# Patient Record
Sex: FEMALE | Race: WHITE | NOT HISPANIC OR LATINO | Employment: FULL TIME | URBAN - METROPOLITAN AREA
[De-identification: names, ages, dates, MRNs, and addresses within clinical notes are randomized per-mention and may not be internally consistent; named-entity substitution may affect disease eponyms.]

---

## 2018-01-26 ENCOUNTER — HISTORICAL (OUTPATIENT)
Dept: ADMINISTRATIVE | Facility: HOSPITAL | Age: 28
End: 2018-01-26

## 2018-04-24 ENCOUNTER — HISTORICAL (OUTPATIENT)
Dept: ADMINISTRATIVE | Facility: HOSPITAL | Age: 28
End: 2018-04-24

## 2018-11-08 ENCOUNTER — HISTORICAL (OUTPATIENT)
Dept: ADMINISTRATIVE | Facility: HOSPITAL | Age: 28
End: 2018-11-08

## 2018-12-16 ENCOUNTER — HISTORICAL (OUTPATIENT)
Dept: ADMINISTRATIVE | Facility: HOSPITAL | Age: 28
End: 2018-12-16

## 2019-01-03 ENCOUNTER — HISTORICAL (OUTPATIENT)
Dept: ADMINISTRATIVE | Facility: HOSPITAL | Age: 29
End: 2019-01-03

## 2019-04-22 ENCOUNTER — HISTORICAL (OUTPATIENT)
Dept: ADMINISTRATIVE | Facility: HOSPITAL | Age: 29
End: 2019-04-22

## 2019-04-22 LAB — B-HCG SERPL QL: NEGATIVE

## 2019-08-19 ENCOUNTER — HISTORICAL (OUTPATIENT)
Dept: ADMINISTRATIVE | Facility: HOSPITAL | Age: 29
End: 2019-08-19

## 2019-10-18 ENCOUNTER — HISTORICAL (OUTPATIENT)
Dept: ADMINISTRATIVE | Facility: HOSPITAL | Age: 29
End: 2019-10-18

## 2019-10-18 LAB
ABS NEUT (OLG): 3.88 X10(3)/MCL (ref 2.1–9.2)
BASOPHILS # BLD AUTO: 0 X10(3)/MCL (ref 0–0.2)
BASOPHILS NFR BLD AUTO: 1 %
EOSINOPHIL # BLD AUTO: 0.2 X10(3)/MCL (ref 0–0.9)
EOSINOPHIL NFR BLD AUTO: 3 %
ERYTHROCYTE [DISTWIDTH] IN BLOOD BY AUTOMATED COUNT: 22.7 % (ref 11.5–17)
FERRITIN SERPL-MCNC: 34 NG/ML (ref 8–388)
HCT VFR BLD AUTO: 41 % (ref 37–47)
HGB BLD-MCNC: 11.5 GM/DL (ref 12–16)
IMM GRANULOCYTES # BLD AUTO: 0.01 % (ref 0–0.02)
IMM GRANULOCYTES NFR BLD AUTO: 0.2 % (ref 0–0.43)
IRON SATN MFR SERPL: 7.6 % (ref 20–50)
IRON SERPL-MCNC: 24 MCG/DL (ref 50–175)
LYMPHOCYTES # BLD AUTO: 1.3 X10(3)/MCL (ref 0.6–4.6)
LYMPHOCYTES NFR BLD AUTO: 21 %
MCH RBC QN AUTO: 22 PG (ref 27–31)
MCHC RBC AUTO-ENTMCNC: 28 GM/DL (ref 33–36)
MCV RBC AUTO: 78.4 FL (ref 80–94)
MONOCYTES # BLD AUTO: 0.5 X10(3)/MCL (ref 0.1–1.3)
MONOCYTES NFR BLD AUTO: 8 %
NEUTROPHILS # BLD AUTO: 3.88 X10(3)/MCL (ref 1.4–7.9)
NEUTROPHILS NFR BLD AUTO: 66 %
PLATELET # BLD AUTO: 394 X10(3)/MCL (ref 130–400)
PMV BLD AUTO: 8.7 FL (ref 9.4–12.4)
RBC # BLD AUTO: 5.23 X10(6)/MCL (ref 4.2–5.4)
TIBC SERPL-MCNC: 315 MCG/DL (ref 250–450)
TRANSFERRIN SERPL-MCNC: 246 MG/DL (ref 200–360)
WBC # SPEC AUTO: 5.9 X10(3)/MCL (ref 4.5–11.5)

## 2020-07-01 ENCOUNTER — HISTORICAL (OUTPATIENT)
Dept: ADMINISTRATIVE | Facility: HOSPITAL | Age: 30
End: 2020-07-01

## 2022-01-26 ENCOUNTER — HISTORICAL (OUTPATIENT)
Dept: LAB | Facility: HOSPITAL | Age: 32
End: 2022-01-26

## 2022-01-26 LAB
ALBUMIN SERPL-MCNC: 4.4 GM/DL (ref 3.5–5)
ALBUMIN/GLOB SERPL: 1.2 RATIO (ref 1.1–2)
ALP SERPL-CCNC: 117 UNIT/L (ref 40–150)
ALT SERPL-CCNC: 8 UNIT/L (ref 0–55)
AST SERPL-CCNC: 11 UNIT/L (ref 5–34)
BILIRUB SERPL-MCNC: 0.4 MG/DL
BILIRUBIN DIRECT+TOT PNL SERPL-MCNC: 0.2 MG/DL (ref 0–0.5)
BILIRUBIN DIRECT+TOT PNL SERPL-MCNC: 0.2 MG/DL (ref 0–0.8)
BUN SERPL-MCNC: 9 MG/DL (ref 7–18.7)
CALCIUM SERPL-MCNC: 10 MG/DL (ref 8.7–10.5)
CHLORIDE SERPL-SCNC: 106 MMOL/L (ref 98–107)
CHOLEST SERPL-MCNC: 160 MG/DL
CHOLEST/HDLC SERPL: 4 {RATIO} (ref 0–5)
CO2 SERPL-SCNC: 25 MMOL/L (ref 22–29)
CREAT SERPL-MCNC: 0.86 MG/DL (ref 0.55–1.02)
DEPRECATED CALCIDIOL+CALCIFEROL SERPL-MC: 24.8 NG/ML (ref 30–80)
ERYTHROCYTE [DISTWIDTH] IN BLOOD BY AUTOMATED COUNT: 14 % (ref 11.5–17)
FT4I SERPL CALC-MCNC: 2.56 (ref 2.6–3.6)
GLOBULIN SER-MCNC: 3.6 GM/DL (ref 2.4–3.5)
GLUCOSE SERPL-MCNC: 93 MG/DL (ref 74–100)
HCT VFR BLD AUTO: 41.3 % (ref 37–47)
HDLC SERPL-MCNC: 45 MG/DL (ref 35–60)
HGB BLD-MCNC: 12.8 GM/DL (ref 12–16)
LDLC SERPL CALC-MCNC: 101 MG/DL (ref 50–140)
MCH RBC QN AUTO: 25.9 PG (ref 27–31)
MCHC RBC AUTO-ENTMCNC: 31 GM/DL (ref 33–36)
MCV RBC AUTO: 83.6 FL (ref 80–94)
PLATELET # BLD AUTO: 361 X10(3)/MCL (ref 130–400)
PMV BLD AUTO: 8.9 FL (ref 9.4–12.4)
POTASSIUM SERPL-SCNC: 4.3 MMOL/L (ref 3.5–5.1)
PROT SERPL-MCNC: 8 GM/DL (ref 6.4–8.3)
RBC # BLD AUTO: 4.94 X10(6)/MCL (ref 4.2–5.4)
SODIUM SERPL-SCNC: 140 MMOL/L (ref 136–145)
T3RU NFR SERPL: 29.54 % (ref 31–39)
T4 SERPL-MCNC: 8.66 UG/DL (ref 4.87–11.72)
TRIGL SERPL-MCNC: 71 MG/DL (ref 37–140)
TSH SERPL-ACNC: 1.62 UIU/ML (ref 0.35–4.94)
VLDLC SERPL CALC-MCNC: 14 MG/DL
WBC # SPEC AUTO: 6.2 X10(3)/MCL (ref 4.5–11.5)

## 2022-03-03 ENCOUNTER — HISTORICAL (OUTPATIENT)
Dept: LAB | Facility: HOSPITAL | Age: 32
End: 2022-03-03

## 2022-03-03 LAB
FT4I SERPL CALC-MCNC: 2.37 (ref 2.6–3.6)
HEMOLYSIS INTERF INDEX SERPL-ACNC: 8
MAGNESIUM SERPL-MCNC: 2.3 MG/DL (ref 1.6–2.6)
T3RU NFR SERPL: 30.11 % (ref 31–39)
T4 SERPL-MCNC: 7.87 UG/DL (ref 4.87–11.72)
TSH SERPL-ACNC: 0.68 M[IU]/L (ref 0.35–4.94)

## 2022-04-07 ENCOUNTER — HISTORICAL (OUTPATIENT)
Dept: ADMINISTRATIVE | Facility: HOSPITAL | Age: 32
End: 2022-04-07
Payer: MEDICAID

## 2022-04-23 VITALS
HEIGHT: 65 IN | BODY MASS INDEX: 25.34 KG/M2 | SYSTOLIC BLOOD PRESSURE: 120 MMHG | DIASTOLIC BLOOD PRESSURE: 79 MMHG | WEIGHT: 152.13 LBS

## 2022-05-01 NOTE — HISTORICAL [facility] CERNER
This is a historical note converted from [place]. Formatting and pictures may have been removed.  Please reference [place] for original formatting and attached multimedia. History of Present Illness  Patient is a 27 yo F who presents for EGD. Patient was previously seen in surgery clinic for epigastric pain, worse after eating, with related intermittent emesis after meals. Was noted to have a Gallbladder EF of 26% but normal U/S. She was sent for a XR esophagram which demonstrated a distal esophageal stricture just above the GE junction for which she was referred for EGD. Since she has undergone cholecystectomy. While there was improvement in the pain, she still complains of dysphagia. Worse with solids. Requires sips of liquids to tolerated diet. Denies weight loss. C/o of heartburn. Currently on protonix 40 mg.?C/o of globus sensation.  Denies EtOH and tobacco use.  Review of Systems  12 pt ROS negative except for stated above  Physical Exam  NAD  NC/AT  RRR  CTAB  S/NT/ND  2+ pulses  Assessment/Plan  27 yo F w/ lower esophageal stricture of XR esophagram  - EGD with possible dilation   Problem List/Past Medical History  Ongoing  Chronic UTI  Dermoid tumor  Flank pain  Historical  Asthma  Procedure/Surgical History  Cholecystectomy Laparoscopic (None) (01/14/2019)  Laparoscopy, surgical; cholecystectomy (01/14/2019)  Resection of Gallbladder, Percutaneous Endoscopic Approach (01/14/2019)  Exploration Laparotomy (Right) (05/03/2018)  Resection of Right Fallopian Tube, Open Approach (05/03/2018)  Resection of Right Ovary, Open Approach (05/03/2018)   Medications  Inpatient  No active inpatient medications  Home  AMOXICILLIN TAB 875MG, 875 mg= 1 tab(s), Oral, BID,? ?Not taking  HYDROCO/APAP TAB 5-325MG,? ?Not Taking, Completed Rx  HYDROCO/APAP TAB 7.5-325,? ?Not Taking, Completed Rx  PANTOPRAZOLE TAB 40MG, 40 mg= 1 tab(s), Oral, Daily  PROMETHAZINE TAB 12.5MG,? ?Not Taking, Completed Rx  Allergies  No Known  Allergies  Social History  Alcohol  Never, 04/18/2019  Employment/School  Unemployed, 12/18/2018  Exercise  Exercise duration: 0., 12/18/2018  Home/Environment  Lives with Mother. Living situation: Home/Independent. Alcohol abuse in household: No. Substance abuse in household: No. Smoker in household: No. Injuries/Abuse/Neglect in household: No., 12/18/2018  Nutrition/Health  Regular, 12/18/2018  Substance Abuse  Never, 04/18/2019  Tobacco  Never (less than 100 in lifetime), N/A, 04/18/2019  Family History  Asthma.: Sister.  COPD (chronic obstructive pulmonary disease).: Mother.  Hypertension.: Mother and Brother.  Renal failure syndrome.: Mother and Father.  Stroke: Mother.      Patient seen and examined in conjunction with the resident on procedure day. ?I actively participated in all aspects of todays patient encounter along with the resident, and agree with the assessment and plan as outlined above.??  ?

## 2022-07-24 ENCOUNTER — HOSPITAL ENCOUNTER (EMERGENCY)
Facility: HOSPITAL | Age: 32
Discharge: HOME | End: 2022-07-24
Attending: STUDENT IN AN ORGANIZED HEALTH CARE EDUCATION/TRAINING PROGRAM
Payer: MEDICAID

## 2022-07-24 VITALS
DIASTOLIC BLOOD PRESSURE: 68 MMHG | OXYGEN SATURATION: 99 % | SYSTOLIC BLOOD PRESSURE: 120 MMHG | RESPIRATION RATE: 15 BRPM | TEMPERATURE: 98 F | HEART RATE: 78 BPM

## 2022-07-24 DIAGNOSIS — G43.109 MIGRAINE WITH AURA AND WITHOUT STATUS MIGRAINOSUS, NOT INTRACTABLE: Primary | ICD-10-CM

## 2022-07-24 LAB
ALBUMIN SERPL-MCNC: 4.1 GM/DL (ref 3.5–5)
ALBUMIN/GLOB SERPL: 1.1 RATIO (ref 1.1–2)
ALP SERPL-CCNC: 122 UNIT/L (ref 40–150)
ALT SERPL-CCNC: 9 UNIT/L (ref 0–55)
APPEARANCE UR: CLEAR
AST SERPL-CCNC: 14 UNIT/L (ref 5–34)
B-HCG SERPL QL: NEGATIVE
BACTERIA #/AREA URNS AUTO: ABNORMAL /HPF
BASOPHILS # BLD AUTO: 0.05 X10(3)/MCL (ref 0–0.2)
BASOPHILS NFR BLD AUTO: 0.5 %
BILIRUB UR QL STRIP.AUTO: NEGATIVE MG/DL
BILIRUBIN DIRECT+TOT PNL SERPL-MCNC: 0.1 MG/DL
BUN SERPL-MCNC: 6.8 MG/DL (ref 7–18.7)
CALCIUM SERPL-MCNC: 10.2 MG/DL (ref 8.4–10.2)
CHLORIDE SERPL-SCNC: 110 MMOL/L (ref 98–107)
CO2 SERPL-SCNC: 22 MMOL/L (ref 22–29)
COLOR UR AUTO: YELLOW
CREAT SERPL-MCNC: 0.72 MG/DL (ref 0.55–1.02)
EOSINOPHIL # BLD AUTO: 0.13 X10(3)/MCL (ref 0–0.9)
EOSINOPHIL NFR BLD AUTO: 1.3 %
ERYTHROCYTE [DISTWIDTH] IN BLOOD BY AUTOMATED COUNT: 14.3 % (ref 11.5–17)
GLOBULIN SER-MCNC: 3.7 GM/DL (ref 2.4–3.5)
GLUCOSE SERPL-MCNC: 105 MG/DL (ref 74–100)
GLUCOSE UR QL STRIP.AUTO: NEGATIVE MG/DL
HCT VFR BLD AUTO: 40.2 % (ref 37–47)
HGB BLD-MCNC: 12.3 GM/DL (ref 12–16)
IMM GRANULOCYTES # BLD AUTO: 0.05 X10(3)/MCL (ref 0–0.04)
IMM GRANULOCYTES NFR BLD AUTO: 0.5 %
INR BLD: 0.98 (ref 0–1.3)
KETONES UR QL STRIP.AUTO: NEGATIVE MG/DL
LEUKOCYTE ESTERASE UR QL STRIP.AUTO: ABNORMAL UNIT/L
LYMPHOCYTES # BLD AUTO: 1.28 X10(3)/MCL (ref 0.6–4.6)
LYMPHOCYTES NFR BLD AUTO: 13.1 %
MAGNESIUM SERPL-MCNC: 2 MG/DL (ref 1.6–2.6)
MCH RBC QN AUTO: 25.5 PG (ref 27–31)
MCHC RBC AUTO-ENTMCNC: 30.6 MG/DL (ref 33–36)
MCV RBC AUTO: 83.4 FL (ref 80–94)
MONOCYTES # BLD AUTO: 0.44 X10(3)/MCL (ref 0.1–1.3)
MONOCYTES NFR BLD AUTO: 4.5 %
NEUTROPHILS # BLD AUTO: 7.8 X10(3)/MCL (ref 2.1–9.2)
NEUTROPHILS NFR BLD AUTO: 80.1 %
NITRITE UR QL STRIP.AUTO: NEGATIVE
NRBC BLD AUTO-RTO: 0 %
PH UR STRIP.AUTO: 6 [PH]
PLATELET # BLD AUTO: 432 X10(3)/MCL (ref 130–400)
PMV BLD AUTO: 8.6 FL (ref 7.4–10.4)
POCT GLUCOSE: 93 MG/DL (ref 70–110)
POTASSIUM SERPL-SCNC: 4.4 MMOL/L (ref 3.5–5.1)
PROT SERPL-MCNC: 7.8 GM/DL (ref 6.4–8.3)
PROT UR QL STRIP.AUTO: NEGATIVE MG/DL
PROTHROMBIN TIME: 12.8 SECONDS (ref 12.5–14.5)
RBC # BLD AUTO: 4.82 X10(6)/MCL (ref 4.2–5.4)
RBC #/AREA URNS AUTO: <5 /HPF
RBC UR QL AUTO: ABNORMAL UNIT/L
SODIUM SERPL-SCNC: 142 MMOL/L (ref 136–145)
SP GR UR STRIP.AUTO: 1.01 (ref 1–1.03)
SQUAMOUS #/AREA URNS AUTO: 6 /HPF
TSH SERPL-ACNC: 1.07 UIU/ML (ref 0.35–4.94)
UROBILINOGEN UR STRIP-ACNC: 0.2 MG/DL
WBC # SPEC AUTO: 9.7 X10(3)/MCL (ref 4.5–11.5)
WBC #/AREA URNS AUTO: 8 /HPF

## 2022-07-24 PROCEDURE — 85025 COMPLETE CBC W/AUTO DIFF WBC: CPT | Performed by: NURSE PRACTITIONER

## 2022-07-24 PROCEDURE — 81001 URINALYSIS AUTO W/SCOPE: CPT | Performed by: NURSE PRACTITIONER

## 2022-07-24 PROCEDURE — 96374 THER/PROPH/DIAG INJ IV PUSH: CPT

## 2022-07-24 PROCEDURE — 85610 PROTHROMBIN TIME: CPT | Performed by: NURSE PRACTITIONER

## 2022-07-24 PROCEDURE — 96375 TX/PRO/DX INJ NEW DRUG ADDON: CPT

## 2022-07-24 PROCEDURE — 83735 ASSAY OF MAGNESIUM: CPT | Performed by: NURSE PRACTITIONER

## 2022-07-24 PROCEDURE — 82962 GLUCOSE BLOOD TEST: CPT

## 2022-07-24 PROCEDURE — 96361 HYDRATE IV INFUSION ADD-ON: CPT

## 2022-07-24 PROCEDURE — 99285 EMERGENCY DEPT VISIT HI MDM: CPT | Mod: 25

## 2022-07-24 PROCEDURE — 81025 URINE PREGNANCY TEST: CPT | Performed by: NURSE PRACTITIONER

## 2022-07-24 PROCEDURE — 36415 COLL VENOUS BLD VENIPUNCTURE: CPT | Performed by: NURSE PRACTITIONER

## 2022-07-24 PROCEDURE — 80053 COMPREHEN METABOLIC PANEL: CPT | Performed by: NURSE PRACTITIONER

## 2022-07-24 PROCEDURE — 63600175 PHARM REV CODE 636 W HCPCS: Performed by: STUDENT IN AN ORGANIZED HEALTH CARE EDUCATION/TRAINING PROGRAM

## 2022-07-24 PROCEDURE — 84443 ASSAY THYROID STIM HORMONE: CPT | Performed by: NURSE PRACTITIONER

## 2022-07-24 RX ORDER — PROCHLORPERAZINE EDISYLATE 5 MG/ML
10 INJECTION INTRAMUSCULAR; INTRAVENOUS
Status: COMPLETED | OUTPATIENT
Start: 2022-07-24 | End: 2022-07-24

## 2022-07-24 RX ORDER — DIPHENHYDRAMINE HYDROCHLORIDE 50 MG/ML
50 INJECTION INTRAMUSCULAR; INTRAVENOUS
Status: COMPLETED | OUTPATIENT
Start: 2022-07-24 | End: 2022-07-24

## 2022-07-24 RX ORDER — KETOROLAC TROMETHAMINE 30 MG/ML
15 INJECTION, SOLUTION INTRAMUSCULAR; INTRAVENOUS
Status: COMPLETED | OUTPATIENT
Start: 2022-07-24 | End: 2022-07-24

## 2022-07-24 RX ORDER — BUSPIRONE HYDROCHLORIDE 15 MG/1
15 TABLET ORAL 3 TIMES DAILY
COMMUNITY
Start: 2022-06-21 | End: 2023-03-29 | Stop reason: ALTCHOICE

## 2022-07-24 RX ADMIN — SODIUM CHLORIDE, POTASSIUM CHLORIDE, SODIUM LACTATE AND CALCIUM CHLORIDE 1000 ML: 600; 310; 30; 20 INJECTION, SOLUTION INTRAVENOUS at 09:07

## 2022-07-24 RX ADMIN — PROCHLORPERAZINE EDISYLATE 10 MG: 5 INJECTION INTRAMUSCULAR; INTRAVENOUS at 09:07

## 2022-07-24 RX ADMIN — DIPHENHYDRAMINE HYDROCHLORIDE 50 MG: 50 INJECTION, SOLUTION INTRAMUSCULAR; INTRAVENOUS at 09:07

## 2022-07-24 RX ADMIN — KETOROLAC TROMETHAMINE 15 MG: 30 INJECTION, SOLUTION INTRAMUSCULAR at 09:07

## 2022-07-25 NOTE — FIRST PROVIDER EVALUATION
Medical screening exam completed.  I have conducted a focused provider triage encounter, findings are as follows:    Brief history of present illness:  31 y/o female who presents with episode of dizziness, arm numbness and facial numbness which lasted 3-4 minutes. States she has nystagmus with it as well. She gets these episodes off and on daily but today it felt worse than usual. She is supposed to get MRI but waiting on this appt. Family history of brain aneurysm. C/o posterior head pain and still feels dizzy.    There were no vitals filed for this visit.    Pertinent physical exam:  Alert, nonlabored, ambulatory with steady gait    Brief workup plan:  Labs, urine, exam    Preliminary workup initiated; this workup will be continued and followed by the physician or advanced practice provider that is assigned to the patient when roomed.

## 2022-07-25 NOTE — ED PROVIDER NOTES
Encounter Date: 7/24/2022    SCRIBE #1 NOTE: I, Ezio Patrick, am scribing for, and in the presence of,  Toño Do MD. I have scribed the following portions of the note - Other sections scribed: HPI, ROS, PE, MDM.       History     Chief Complaint   Patient presents with    Dizziness     Patient had an episode of dizziness, with arms going numb, facial numbness.  States has been having these episodes since age 12.  Waiting on appt. For MRI. Currently has a headache.    Headache     I, Toño Do MD assumed care at 2010    31 y/o F with hx of depression and anxiety presents to ED with c/o having episodes of headaches, vertigo, numbness to face and arms, extremity weakness, blurred vision, and pain to the back of her neck since she was 12 y/o that she states is worsening with age. She reports these episodes can last from minutes to hours and she has about 2 a day. Pt explains she is waiting for [facility] to get a MRI, but they have not called her back. Pt also c/o N/V, but denies fever, chills, dysuria, or doing any ETOH or drugs. Pt also states hx of migraines in which she reports these recent episodes feels worse. Pt  PCP is Dr. Aguero. NKA    The history is provided by the patient. No  was used.   Headache   This is a chronic problem. Episode onset: 12 y/o. Episode frequency: 2x a day. Pain location: back of head/neck. The pain does not radiate. Associated symptoms include nausea, neck pain, numbness, vomiting and weakness. Pertinent negatives include no abdominal pain, coughing, dizziness, ear pain, fever, rhinorrhea, seizures or sore throat. Nothing aggravates the symptoms. She has tried nothing for the symptoms. The treatment provided no relief.     Review of patient's allergies indicates:  No Known Allergies  No past medical history on file.  No past surgical history on file.  No family history on file.     Review of Systems   Constitutional: Negative.  Negative for chills, fatigue and  fever.   HENT: Negative.  Negative for congestion, ear discharge, ear pain, nosebleeds, rhinorrhea and sore throat.    Eyes:        Blurred vision    Respiratory: Negative.  Negative for cough, chest tightness, shortness of breath and wheezing.    Cardiovascular: Negative.  Negative for chest pain and leg swelling.   Gastrointestinal: Positive for nausea and vomiting. Negative for abdominal pain, blood in stool, constipation and diarrhea.   Endocrine: Negative.    Genitourinary: Negative.  Negative for dysuria, frequency, hematuria and urgency.   Musculoskeletal: Positive for neck pain. Negative for arthralgias and myalgias.   Skin: Negative.    Allergic/Immunologic: Negative.    Neurological: Positive for weakness, numbness and headaches. Negative for dizziness and seizures.   Psychiatric/Behavioral: Negative.        Physical Exam     Initial Vitals [07/24/22 1932]   BP Pulse Resp Temp SpO2   (!) 142/78 98 20 98.4 °F (36.9 °C) 100 %      MAP       --         Physical Exam    Nursing note and vitals reviewed.  Constitutional: She appears well-developed and well-nourished. She is not diaphoretic. No distress.   HENT:   Head: Normocephalic and atraumatic.   Right Ear: External ear normal.   Left Ear: External ear normal.   Nose: Nose normal.   Mouth/Throat: Oropharynx is clear and moist.   Eyes: Conjunctivae and EOM are normal. Pupils are equal, round, and reactive to light. Right eye exhibits no discharge. Left eye exhibits no discharge. No scleral icterus.   Neck: Neck supple. No tracheal deviation present.   Normal range of motion.  Cardiovascular: Normal rate, regular rhythm, normal heart sounds and intact distal pulses. Exam reveals no gallop and no friction rub.    No murmur heard.  Pulmonary/Chest: Breath sounds normal. No stridor. No respiratory distress. She has no wheezes. She has no rhonchi. She has no rales. She exhibits no tenderness.   Abdominal: Abdomen is soft. Bowel sounds are normal. She exhibits no  distension and no mass. There is no abdominal tenderness. There is no guarding.   Musculoskeletal:         General: No tenderness or edema. Normal range of motion.      Cervical back: Normal range of motion and neck supple.     Neurological: She is alert and oriented to person, place, and time. No cranial nerve deficit or sensory deficit.   No focal nerve deficits, equal sensation bilaterally    Skin: Skin is warm and dry. Capillary refill takes less than 2 seconds. No rash noted. No erythema. No pallor.   Psychiatric: She has a normal mood and affect.         ED Course   Procedures  Labs Reviewed   CBC WITH DIFFERENTIAL - Abnormal; Notable for the following components:       Result Value    MCH 25.5 (*)     MCHC 30.6 (*)     Platelet 432 (*)     IG# 0.05 (*)     All other components within normal limits   COMPREHENSIVE METABOLIC PANEL - Abnormal; Notable for the following components:    Chloride 110 (*)     Glucose Level 105 (*)     Blood Urea Nitrogen 6.8 (*)     Globulin 3.7 (*)     All other components within normal limits   URINALYSIS, REFLEX TO URINE CULTURE - Abnormal; Notable for the following components:    Blood, UA 1+ (*)     Leukocyte Esterase, UA Trace (*)     All other components within normal limits   URINALYSIS, MICROSCOPIC - Abnormal; Notable for the following components:    WBC, UA 8 (*)     Squamous Epithelial Cells, UA 6 (*)     All other components within normal limits   MAGNESIUM - Normal   TSH - Normal   PREGNANCY TEST, URINE RAPID - Normal   PROTIME-INR - Normal   POCT GLUCOSE          Imaging Results          CT Head Without Contrast (Preliminary result)  Result time 07/24/22 20:30:07    Preliminary result by Musa Ross MD (07/24/22 20:30:07)                 Narrative:    START OF REPORT:  Technique: CT of the head was performed without intravenous contrast with axial as well as coronal and sagittal images.    Comparison: None.    Dosage Information: Automated exposure control was  utilized.    Clinical history: Dizziness, headache.    Findings:  Hemorrhage: No acute intracranial hemorrhage is seen.  CSF spaces: The ventricles sulci and basal cisterns are within normal limits.  Brain parenchyma: Unremarkable with preservation of the grey white junction throughout.  Cerebellum: Unremarkable.  Sella and skull base: The sella appears to be within normal limits for age.  Intracranial calcifications: Incidental note is made of bilateral choroid plexus calcification. Incidental note is made of some pineal region calcification.  Calvarium: No acute linear or depressed skull fracture is seen.    Maxillofacial Structures:  Paranasal sinuses: The visualized paranasal sinuses appear clear with no significant mucoperiosteal thickening or air fluid levels identified.  Orbits: The orbits appear unremarkable.  Zygomatic arches: The zygomatic arches are intact and unremarkable.  Temporal bones and mastoids: The temporal bones and mastoids appear unremarkable.  TMJ: The mandibular condyles appear normally placed with respect to the mandibular fossa.      Impression:  1. No acute intracranial process identified. Details as above.                                   Medications   diphenhydrAMINE injection 50 mg (50 mg Intravenous Given 7/24/22 2106)   prochlorperazine injection Soln 10 mg (10 mg Intravenous Given 7/24/22 2107)   lactated ringers bolus 1,000 mL (0 mLs Intravenous Stopped 7/24/22 2200)   ketorolac injection 15 mg (15 mg Intravenous Given 7/24/22 2106)     Medical Decision Making:   Initial Assessment:   Patient with these headaches that result in some neuro deficits lasting anywhere from several minutes to several hours.  Been going on almost daily basis for the past 2 decades.  Differential Diagnosis:   CVA, TIA, seizure, migraine  Clinical Tests:   Lab Tests: Ordered and Reviewed  Radiological Study: Ordered and Reviewed  ED Management:  Patient awake and alert.  She has no focal neuro deficit  this time.  Does have a headache.  She has had symptoms like this for the past 2 decades.  She is to me get MRIs an outpatient.  She has never seen a neurologist.  I am concerned that she may be having some migraines with some neuro symptoms.  She reports that her symptoms are bilateral in nature.  After which she has a headache.  She also does report a history of headaches has never been evaluated for migraines in the past.  Her labs are unremarkable.  CT head within normal limits.  I believe that she is suitable for discharge home she reports she feels significantly better after Benadryl, Toradol, Compazine here in the emergency department.  Patient is suitable for discharge home I believe.  I have discussed findings with patient and family in room.  Course invited, question answered best my ability.  Patient discharged home condition stable.          Scribe Attestation:   Scribe #1: I performed the above scribed service and the documentation accurately describes the services I performed. I attest to the accuracy of the note.    Attending Attestation:           Physician Attestation for Scribe:  Physician Attestation Statement for Scribe #1: I, Toño Do MD, reviewed documentation, as scribed by Ezio Patrick in my presence, and it is both accurate and complete.                      Clinical Impression:   Final diagnoses:  [G43.109] Migraine with aura and without status migrainosus, not intractable (Primary)          ED Disposition Condition    Discharge Stable        ED Prescriptions     None        Follow-up Information     Follow up With Specialties Details Why Contact Info    Lore Aguero NP Internal Medicine Call in 3 days  [address] [facility] [id number] [phone]             Toño Do MD  07/25/22 0342

## 2022-07-25 NOTE — DISCHARGE INSTRUCTIONS
Return to the emergency department if you develop worsening symptoms including: fever, chills, chest pain, shortness of breath, weakness, numbness, tingling, nausea, vomiting, inability to eat, drink, or take your medication.    Please continue to take all medications as prescribed.    A referral has been placed our neurologist.  Please expect a phone call within the next week or 2.    Please follow-up with the primary care physician and continue to seek an MRI for further evaluation

## 2022-07-25 NOTE — ED NOTES
Discharge instructions, return precautions, follow up information provided to pt. Pt verbalizes understanding. All questions answered. Pt is GCS 15, equal unlabored respirations. Pt ambulated to exit with steady gait accompanied by sister.

## 2022-10-26 DIAGNOSIS — E23.7 DISORDER OF PITUITARY GLAND: Primary | ICD-10-CM

## 2022-10-26 DIAGNOSIS — G43.909 MIGRAINE WITHOUT STATUS MIGRAINOSUS, NOT INTRACTABLE, UNSPECIFIED MIGRAINE TYPE: Primary | ICD-10-CM

## 2022-10-26 DIAGNOSIS — G43.009 MIGRAINE WITHOUT AURA AND WITHOUT STATUS MIGRAINOSUS, NOT INTRACTABLE: ICD-10-CM

## 2023-03-22 DIAGNOSIS — R13.10 DYSPHAGIA, UNSPECIFIED: Primary | ICD-10-CM

## 2023-03-22 DIAGNOSIS — D35.2 BENIGN NEOPLASM OF PITUITARY GLAND: ICD-10-CM

## 2023-03-28 NOTE — PROGRESS NOTES
[facility] Neurology Initial Office Visit Note    Initial Visit Date: 3/29/2023  Current Visit Date:  03/29/2023    Chief Complaint:     Chief Complaint   Patient presents with    Patient presents today with daily migraines     Patient states that she has a tumor and she loses her vision sometimes and she also complains of losing her balance. Patient mentioned numbness in her face and hands and cramp feelings in her ears.         History of Present Illness:      This is 32 y.o. female with history of anxiety disorder, depression, dermoid cyst who is referred headache disorder.    Age of Onset: 11 years old     Headache Description: bitemporal, pounding, severe, lasting at least 4 hours, with nausea, with photophobia and phonophobia. + right on visual scotoma.    Frequency:  At least 15 migraine headache days per month since 11 years old.     Provocation Factors: denied    Risk Factors  - Family history of headache disorder: Yes mom and sister with headache disorder.   - History of focal CNS lesions: Yes pituitary adenoma.  - History of CNS infections: No  - History head trauma: No  - History of underlying mood disorder: Yes anxiety disorder, depression, OC  - History of sleep disorder: Yes not sleeping well.   - Recreational drug use: No  - Tobacco use: No  - Alcohol use: No  - Weight fluctuation: Yes fluctuate   - Isotretinoin or Tetracycline use:  No  - Family planning and contraceptive use: irregular menses.     Medications:     Current Prophylactic  Wellbutrin 150 mg twice a day (9/30/2022 to present)  Paroxetine 10 mg once a day (9/30/2022 to present)    Current Abortive  Zofran 8 mg twice a day as needed (10/10/2022 to present)  Rizatriptan 10 mg twice a day as needed (10/10/2022 to present): ineffective.  Not taking.    Prior Prophylactic  Denied     Prior Abortive  Denied     Devices:     - VNS:  - TNS  - TMS:     Procedures:     - Botox:  - PSG block:   - Occipital nerve block:     Labs:     Results for orders  placed or performed during the hospital encounter of 07/24/22   CBC with Differential   Result Value Ref Range    WBC 9.7 4.5 - 11.5 x10(3)/mcL    RBC 4.82 4.20 - 5.40 x10(6)/mcL    Hgb 12.3 12.0 - 16.0 gm/dL    Hct 40.2 37.0 - 47.0 %    MCV 83.4 80.0 - 94.0 fL    MCH 25.5 (L) 27.0 - 31.0 pg    MCHC 30.6 (L) 33.0 - 36.0 mg/dL    RDW 14.3 11.5 - 17.0 %    Platelet 432 (H) 130 - 400 x10(3)/mcL    MPV 8.6 7.4 - 10.4 fL    Neut % 80.1 %    Lymph % 13.1 %    Mono % 4.5 %    Eos % 1.3 %    Basophil % 0.5 %    Lymph # 1.28 0.6 - 4.6 x10(3)/mcL    Neut # 7.8 2.1 - 9.2 x10(3)/mcL    Mono # 0.44 0.1 - 1.3 x10(3)/mcL    Eos # 0.13 0 - 0.9 x10(3)/mcL    Baso # 0.05 0 - 0.2 x10(3)/mcL    IG# 0.05 (H) 0 - 0.04 x10(3)/mcL    IG% 0.5 %    NRBC% 0.0 %   Comprehensive Metabolic Panel   Result Value Ref Range    Sodium Level 142 136 - 145 mmol/L    Potassium Level 4.4 3.5 - 5.1 mmol/L    Chloride 110 (H) 98 - 107 mmol/L    Carbon Dioxide 22 22 - 29 mmol/L    Glucose Level 105 (H) 74 - 100 mg/dL    Blood Urea Nitrogen 6.8 (L) 7.0 - 18.7 mg/dL    Creatinine 0.72 0.55 - 1.02 mg/dL    Calcium Level Total 10.2 8.4 - 10.2 mg/dL    Protein Total 7.8 6.4 - 8.3 gm/dL    Albumin Level 4.1 3.5 - 5.0 gm/dL    Globulin 3.7 (H) 2.4 - 3.5 gm/dL    Albumin/Globulin Ratio 1.1 1.1 - 2.0 ratio    Bilirubin Total 0.1 <=1.5 mg/dL    Alkaline Phosphatase 122 40 - 150 unit/L    Alanine Aminotransferase 9 0 - 55 unit/L    Aspartate Aminotransferase 14 5 - 34 unit/L    Estimated GFR-Non  >60 mls/min/1.73/m2   Magnesium   Result Value Ref Range    Magnesium Level 2.00 1.60 - 2.60 mg/dL   TSH   Result Value Ref Range    Thyroid Stimulating Hormone 1.0737 0.3500 - 4.9400 uIU/mL   Urinalysis, Reflex to Urine Culture Urine, Clean Catch    Specimen: Urine, Clean Catch   Result Value Ref Range    Color, UA Yellow Yellow, Colorless, Other, Clear    Appearance, UA Clear Clear    Specific Gravity, UA 1.015 1.001 - 1.030    pH, UA 6.0 5.0, 5.5, 6.0, 6.5,  7.0, 7.5, 8.0, 8.5    Protein, UA Negative Negative, 300  mg/dL    Glucose, UA Negative Negative, Normal mg/dL    Ketones, UA Negative Negative, +1, +2, +3, +4, +5, >=160, >=80 mg/dL    Blood, UA 1+ (A) Negative unit/L    Bilirubin, UA Negative Negative mg/dL    Urobilinogen, UA 0.2 0.2, 1.0, Normal mg/dL    Nitrites, UA Negative Negative    Leukocyte Esterase, UA Trace (A) Negative, 75  unit/L   Pregnancy, urine rapid   Result Value Ref Range    Beta hCG Qualitative, Urine Negative Negative   Protime-INR   Result Value Ref Range    PT 12.8 12.5 - 14.5 seconds    INR 0.98 0.00 - 1.30   Urinalysis, Microscopic   Result Value Ref Range    RBC, UA <5 <=5 /HPF    WBC, UA 8 (H) <=5 /HPF    Squamous Epithelial Cells, UA 6 (H) <=5 /HPF    Bacteria, UA None Seen None Seen, Rare, Occasional /HPF   POCT glucose   Result Value Ref Range    POCT Glucose 93 70 - 110 mg/dL       Studies:     - MRI Brain with and without contrast pituitary protocol at [facility]:  As per documentation, 6 mm hypoenhancing lesion consistent with pituitary microadenoma.    - MRA Head w/o Titus:   - MRV Head w/o Titus:   - NCHCT:  - Lumbar Puncture:    Review of Systems:     Review of Systems   Constitutional:  Positive for diaphoresis and malaise/fatigue.   Cardiovascular:  Positive for palpitations.   Neurological:  Positive for tingling and tremors.   All other systems reviewed and are negative.    Physical Exams:     Vitals:    03/29/23 1434   BP: (!) 135/91   Pulse: 78   Temp: 98.2 °F (36.8 °C)       Physical Exam  Vitals and nursing note reviewed.   Constitutional:       Appearance: Normal appearance.   HENT:      Head: Normocephalic and atraumatic.      Nose: Nose normal.      Mouth/Throat:      Mouth: Mucous membranes are moist.      Pharynx: Oropharynx is clear.   Eyes:      Conjunctiva/sclera: Conjunctivae normal.   Cardiovascular:      Rate and Rhythm: Normal rate and regular rhythm.      Pulses: Normal pulses.   Pulmonary:      Effort: Pulmonary  effort is normal.      Breath sounds: Normal breath sounds.   Abdominal:      General: Abdomen is flat.   Musculoskeletal:         General: Normal range of motion.      Cervical back: Normal range of motion.   Skin:     General: Skin is warm.   Neurological:      Mental Status: She is alert.       Comprehensive Neurological Exam:  Mental Status: Alert Oriented to Self, Date, and Place. Comprehension wnl. No dysarthria.   CN II - XII: FELICIA, No APD, Fundus wnl OU, VFFC, No ptosis OU, EOMI without nystagmus LT/Temp symmetric in CN V1-3 distribution, Hearing grossly intact, Face Symmetric, Tongue and Uvula midline, Trapezius symmetric bilateral.   Motor: tone and bulk wnl throughout, fine high-frequency postural tremor in bilateral upper extremity, 5/5 to confrontation, Fine finger movements wnl b/l, No pronator drift.   Sensory: LT, Proprioception, Vibration, PP, Temp symmetric. No sensory simultagnosia.   Reflexes: 2+ throughout  Cerebellar: FNF wnl b/l, RAHM wnl b/l  Romberg: Negative  Gait: normal.    Assessment:     This is 32 y.o. female with history of anxiety disorder, depression, OCD who is referred for chronic migraine with visual scotoma.  Also states that she has a pituitary microadenoma.  Will also need to rule out hyperthyroidism given exaggerated physiological tremor.    Problem List Items Addressed This Visit    None  Visit Diagnoses       Pituitary adenoma    -  Primary    Relevant Medications    propranoloL (INDERAL) 20 MG tablet    Other Relevant Orders    T3    T4    TSH    CBC auto differential    Comprehensive metabolic panel    Luteinizing Hormone    Follicle Stimulating Hormone    Prolactin    Chronic migraine without aura without status migrainosus, not intractable        Relevant Medications    propranoloL (INDERAL) 20 MG tablet    Other Relevant Orders    T3    T4    TSH    CBC auto differential    Comprehensive metabolic panel    Luteinizing Hormone    Follicle Stimulating Hormone     Prolactin    Anxiety disorder, unspecified type        Relevant Medications    propranoloL (INDERAL) 20 MG tablet    Other Relevant Orders    T3    T4    TSH    CBC auto differential    Comprehensive metabolic panel    Luteinizing Hormone    Follicle Stimulating Hormone    Prolactin            Plan:     [] Start propranolol 20 mg twice a day  [] Continue with rizatriptan 10 mg twice a day as needed  [] CBC, CMP, TSH, free T4, T3, FSH, LH  [] Pending endocrinology evaluation    RTC 3 months    Headache education provided: good sleep hygiene and 7 hours of sleep per night, stress management, medication overuse education provided. Using more 3 OTC per week may worsen headaches, high intensity interval training has shown to reduce headache frequency. Low carb, high protein has shown to reduce headache frequency. Patient is instructed in keep headache diary.     I have explained the treatment plan, diagnosis, and prognosis to patient. All questions are answered to the best of my knowledge.     Face to face time 60 minutes, including documentation, chart review, counseling, education, review of test results, relevant medical records, and coordination of care.       Anabel Silvestre MD   General Neurology  03/29/2023

## 2023-03-29 ENCOUNTER — OFFICE VISIT (OUTPATIENT)
Dept: NEUROLOGY | Facility: CLINIC | Age: 33
End: 2023-03-29
Payer: MEDICAID

## 2023-03-29 VITALS
OXYGEN SATURATION: 100 % | BODY MASS INDEX: 28.71 KG/M2 | HEIGHT: 64 IN | WEIGHT: 168.19 LBS | TEMPERATURE: 98 F | HEART RATE: 78 BPM | SYSTOLIC BLOOD PRESSURE: 135 MMHG | DIASTOLIC BLOOD PRESSURE: 91 MMHG

## 2023-03-29 DIAGNOSIS — F42.9 OBSESSIVE-COMPULSIVE DISORDER, UNSPECIFIED TYPE: ICD-10-CM

## 2023-03-29 DIAGNOSIS — N92.6 IRREGULAR MENSES: ICD-10-CM

## 2023-03-29 DIAGNOSIS — R25.1 PHYSIOLOGICAL TREMOR: ICD-10-CM

## 2023-03-29 DIAGNOSIS — R00.2 PALPITATIONS: ICD-10-CM

## 2023-03-29 DIAGNOSIS — F41.9 ANXIETY DISORDER, UNSPECIFIED TYPE: ICD-10-CM

## 2023-03-29 DIAGNOSIS — G43.709 CHRONIC MIGRAINE WITHOUT AURA WITHOUT STATUS MIGRAINOSUS, NOT INTRACTABLE: Primary | ICD-10-CM

## 2023-03-29 DIAGNOSIS — D35.2 PITUITARY ADENOMA: ICD-10-CM

## 2023-03-29 PROCEDURE — 3080F DIAST BP >= 90 MM HG: CPT | Mod: CPTII,,, | Performed by: PSYCHIATRY & NEUROLOGY

## 2023-03-29 PROCEDURE — 1159F MED LIST DOCD IN RCRD: CPT | Mod: CPTII,,, | Performed by: PSYCHIATRY & NEUROLOGY

## 2023-03-29 PROCEDURE — 99214 OFFICE O/P EST MOD 30 MIN: CPT | Mod: PBBFAC | Performed by: PSYCHIATRY & NEUROLOGY

## 2023-03-29 PROCEDURE — 99205 PR OFFICE/OUTPT VISIT, NEW, LEVL V, 60-74 MIN: ICD-10-PCS | Mod: S$PBB,,, | Performed by: PSYCHIATRY & NEUROLOGY

## 2023-03-29 PROCEDURE — 3008F BODY MASS INDEX DOCD: CPT | Mod: CPTII,,, | Performed by: PSYCHIATRY & NEUROLOGY

## 2023-03-29 PROCEDURE — 3075F PR MOST RECENT SYSTOLIC BLOOD PRESS GE 130-139MM HG: ICD-10-PCS | Mod: CPTII,,, | Performed by: PSYCHIATRY & NEUROLOGY

## 2023-03-29 PROCEDURE — 99205 OFFICE O/P NEW HI 60 MIN: CPT | Mod: S$PBB,,, | Performed by: PSYCHIATRY & NEUROLOGY

## 2023-03-29 PROCEDURE — 3075F SYST BP GE 130 - 139MM HG: CPT | Mod: CPTII,,, | Performed by: PSYCHIATRY & NEUROLOGY

## 2023-03-29 PROCEDURE — 3080F PR MOST RECENT DIASTOLIC BLOOD PRESSURE >= 90 MM HG: ICD-10-PCS | Mod: CPTII,,, | Performed by: PSYCHIATRY & NEUROLOGY

## 2023-03-29 PROCEDURE — 1159F PR MEDICATION LIST DOCUMENTED IN MEDICAL RECORD: ICD-10-PCS | Mod: CPTII,,, | Performed by: PSYCHIATRY & NEUROLOGY

## 2023-03-29 PROCEDURE — 3008F PR BODY MASS INDEX (BMI) DOCUMENTED: ICD-10-PCS | Mod: CPTII,,, | Performed by: PSYCHIATRY & NEUROLOGY

## 2023-03-29 RX ORDER — ONDANSETRON 4 MG/1
4 TABLET, ORALLY DISINTEGRATING ORAL EVERY 8 HOURS PRN
COMMUNITY
Start: 2022-11-15

## 2023-03-29 RX ORDER — FAMOTIDINE 40 MG/1
TABLET, FILM COATED ORAL
COMMUNITY
Start: 2022-08-17

## 2023-03-29 RX ORDER — AMOXICILLIN 875 MG/1
875 TABLET, FILM COATED ORAL 2 TIMES DAILY
COMMUNITY
Start: 2023-03-22 | End: 2023-10-05 | Stop reason: ALTCHOICE

## 2023-03-29 RX ORDER — CALCIUM CARBONATE 200(500)MG
1 TABLET,CHEWABLE ORAL DAILY
COMMUNITY

## 2023-03-29 RX ORDER — RIZATRIPTAN BENZOATE 10 MG/1
TABLET, ORALLY DISINTEGRATING ORAL
COMMUNITY
Start: 2023-01-02 | End: 2023-10-05 | Stop reason: SDUPTHER

## 2023-03-29 RX ORDER — PAROXETINE 10 MG/1
10 TABLET, FILM COATED ORAL
COMMUNITY
Start: 2023-03-22

## 2023-03-29 RX ORDER — PROPRANOLOL HYDROCHLORIDE 20 MG/1
20 TABLET ORAL 2 TIMES DAILY
Qty: 60 TABLET | Refills: 4 | Status: SHIPPED | OUTPATIENT
Start: 2023-03-29 | End: 2023-10-05

## 2023-03-29 RX ORDER — BUPROPION HYDROCHLORIDE 150 MG/1
150 TABLET, EXTENDED RELEASE ORAL 2 TIMES DAILY
COMMUNITY
Start: 2023-03-22

## 2023-03-29 RX ORDER — ERGOCALCIFEROL 1.25 MG/1
50000 CAPSULE ORAL
COMMUNITY
Start: 2023-03-22

## 2023-04-04 ENCOUNTER — HOSPITAL ENCOUNTER (OUTPATIENT)
Dept: RADIOLOGY | Facility: HOSPITAL | Age: 33
Discharge: HOME | End: 2023-04-04
Attending: NURSE PRACTITIONER
Payer: MEDICAID

## 2023-04-04 DIAGNOSIS — D35.2 BENIGN NEOPLASM OF PITUITARY GLAND: ICD-10-CM

## 2023-04-04 DIAGNOSIS — R13.10 DYSPHAGIA, UNSPECIFIED: ICD-10-CM

## 2023-04-04 PROCEDURE — 76536 US EXAM OF HEAD AND NECK: CPT | Mod: TC

## 2023-09-28 ENCOUNTER — OFFICE VISIT (OUTPATIENT)
Dept: ENDOCRINOLOGY | Facility: CLINIC | Age: 33
End: 2023-09-28
Payer: MEDICAID

## 2023-09-28 VITALS
RESPIRATION RATE: 16 BRPM | HEIGHT: 64 IN | TEMPERATURE: 97 F | DIASTOLIC BLOOD PRESSURE: 81 MMHG | WEIGHT: 181.19 LBS | SYSTOLIC BLOOD PRESSURE: 123 MMHG | HEART RATE: 72 BPM | BODY MASS INDEX: 30.93 KG/M2

## 2023-09-28 DIAGNOSIS — N92.6 IRREGULAR PERIODS/MENSTRUAL CYCLES: ICD-10-CM

## 2023-09-28 DIAGNOSIS — D35.2 PITUITARY MICROADENOMA: Primary | ICD-10-CM

## 2023-09-28 DIAGNOSIS — E04.2 MULTINODULAR GOITER: ICD-10-CM

## 2023-09-28 DIAGNOSIS — H53.9 VISION CHANGES: ICD-10-CM

## 2023-09-28 DIAGNOSIS — E55.9 VITAMIN D DEFICIENCY: ICD-10-CM

## 2023-09-28 LAB
ANION GAP SERPL CALC-SCNC: 9 MEQ/L
BUN SERPL-MCNC: 8.5 MG/DL (ref 7–18.7)
CALCIUM SERPL-MCNC: 10.2 MG/DL (ref 8.4–10.2)
CHLORIDE SERPL-SCNC: 106 MMOL/L (ref 98–107)
CO2 SERPL-SCNC: 24 MMOL/L (ref 22–29)
CORTIS SERPL-SCNC: 17.3 UG/DL
CREAT SERPL-MCNC: 0.85 MG/DL (ref 0.55–1.02)
CREAT/UREA NIT SERPL: 10
ESTRADIOL SERPL HS-MCNC: 54 PG/ML
FSH SERPL-ACNC: 7.86 MIU/ML
GFR SERPLBLD CREATININE-BSD FMLA CKD-EPI: >60 MLS/MIN/1.73/M2
GLUCOSE SERPL-MCNC: 86 MG/DL (ref 74–100)
LH SERPL-ACNC: 2.62 MIU/ML
POTASSIUM SERPL-SCNC: 4.8 MMOL/L (ref 3.5–5.1)
SODIUM SERPL-SCNC: 139 MMOL/L (ref 136–145)
T4 FREE SERPL-MCNC: 0.92 NG/DL (ref 0.7–1.48)
TSH SERPL-ACNC: 1.03 UIU/ML (ref 0.35–4.94)

## 2023-09-28 PROCEDURE — 84443 ASSAY THYROID STIM HORMONE: CPT

## 2023-09-28 PROCEDURE — 36415 COLL VENOUS BLD VENIPUNCTURE: CPT

## 2023-09-28 PROCEDURE — 82024 ASSAY OF ACTH: CPT

## 2023-09-28 PROCEDURE — 82533 TOTAL CORTISOL: CPT

## 2023-09-28 PROCEDURE — 83002 ASSAY OF GONADOTROPIN (LH): CPT

## 2023-09-28 PROCEDURE — 83001 ASSAY OF GONADOTROPIN (FSH): CPT

## 2023-09-28 PROCEDURE — 84439 ASSAY OF FREE THYROXINE: CPT

## 2023-09-28 PROCEDURE — 82670 ASSAY OF TOTAL ESTRADIOL: CPT

## 2023-09-28 PROCEDURE — 80048 BASIC METABOLIC PNL TOTAL CA: CPT

## 2023-09-28 PROCEDURE — 84305 ASSAY OF SOMATOMEDIN: CPT

## 2023-09-28 PROCEDURE — 99214 OFFICE O/P EST MOD 30 MIN: CPT | Mod: PBBFAC

## 2023-09-28 RX ORDER — FLUOXETINE HYDROCHLORIDE 40 MG/1
40 CAPSULE ORAL DAILY
COMMUNITY
End: 2023-10-05 | Stop reason: ALTCHOICE

## 2023-09-28 RX ORDER — DEXAMETHASONE 1 MG/1
TABLET ORAL
Qty: 1 TABLET | Refills: 0 | Status: SHIPPED | OUTPATIENT
Start: 2023-09-28 | End: 2023-10-05 | Stop reason: ALTCHOICE

## 2023-09-28 NOTE — PROGRESS NOTES
"[facility] Visit    Chief Complaint:      Pituitary Microadenoma     Subjective:     HPI:  Shaylee Stack is a 33 y.o. female with past medical history of migraine, S/P right-sided dermoid cyst removal and salpingo-oophorectomy, cholecystectomy.  She presents to endocrinology clinic today for Pituitary Microadenoma.  The patient reports that she has been experiencing migraines daily since she was 12 yo. She also endorses intermittent nystagmus, chest palpitations, facial numbness, and bilateral hand and foot numbness that has been going on for "years." In October 2022 her PCP (Fior Aguero, MARLA) ordered an MRI that showed a 6 mm pituitary microadenoma. The patient also visited a neurology (Dr. Anabel Silvestre) in April 2023 who started the patient on a beta-blocker. The patient reports the beta-blocker has improved her chest palpitations and headaches. The patient reports that she also had a thryoid US done in April 2023 that showed small nodules; at the time her PCP discussed with there that a biopsy was not indicated. Today the patient endorses a mild headache, intermittent blurry vision, and body aches. She denies any n/v, dizziness, SOB, new chest pain or palpitations, abdominal pain, dysuria, or changes in bowel or bladder habits.     The patient denies any history of medical problems other than those discussed above. She has a family history of pancreatic cancer in her grandfather, DM, CKD, and epilepsy in her father, brain aneurysm, CHF, COPD, stroke, CKD in her mother, and DM and glaucoma in her grandmother. The patient denies any drug use, alcohol use, smoking, or vaping. She is not sexually active and has no children.     Review of Systems  A comprehensive 12 point review of systems was completed.  Please see above for pertinent positives and negatives.     Objective:   Last 24 Hour Vital Signs:  Vitals  BP: 123/81  Temp: 97.4 °F (36.3 °C)  Temp Source: Oral  Pulse: 72  Resp: 16  Height: 5' 4" " (162.6 cm)  Weight: 82.2 kg (181 lb 3.2 oz)    Physical Examination:  General: Awake, alert, & oriented to person, place & time. No acute distress  Psychiatric: Mood and affect normal  HEENT: Normocephalic, atraumatic. Face symmetric. Mucous membranes moist.   Cardiovascular: Regular rate & rhythm. Normal S1 & S2 w/out murmurs, rubs or gallops.  Pulmonary: Bilateral symmetric chest rise. Non-labored, CTAB  Abdominal:  Soft, nontender, nondistended. Bowel sounds present  Extremities: No clubbing, cyanosis or edema  Skin:  Warm & dry.  Neuro:   Strength 5/5. Sensation intact bilaterally.    Assessment & Plan:     Shaylee Stack is a 33 y.o. female with past medical history of migraine, S/P right-sided dermoid cyst removal and salpingo-oophorectomy, cholecystectomy.   MRI in October 2022 showing 6 mm pituitary microadenoma:  Repeat low-dose MRI to assess for growth in microadenoma  Assess pituitary access functioning:  -ACTH  -Cortisol  -Dexamethasone suppression test  -Estradiol  -FSH  -LH  -Insulin-like growth factor  -TSH  -Free T4      To be addressed at next follow-up  -MRI results  -Pituitary axis hormone testing results    Follow-ups  -Follow-up in 3-4 months      Aliya GODFREYU, MS3

## 2023-09-29 ENCOUNTER — TELEPHONE (OUTPATIENT)
Dept: ENDOCRINOLOGY | Facility: CLINIC | Age: 33
End: 2023-09-29
Payer: MEDICAID

## 2023-09-29 DIAGNOSIS — E04.2 MULTINODULAR GOITER: Primary | ICD-10-CM

## 2023-09-29 LAB — ACTH PLAS-MCNC: 43 PG/ML

## 2023-09-29 NOTE — TELEPHONE ENCOUNTER
Spoke with pharmacy questions regarding dexamethasone tablet and instructions for dex suppression test answered.

## 2023-09-29 NOTE — TELEPHONE ENCOUNTER
----- Message from Belle Garcia sent at 9/29/2023  8:22 AM CDT -----  Regarding: Vini Barroso from [facility]'s [facility] to verify the quantity and the directions for a prescription.   Yakov # [phone]  [facility] left @ 9:27 am on 9/28/23

## 2023-10-02 LAB
IGF-I SERPL-MCNC: 103 NG/ML (ref 59–279)
IGF-I Z-SCORE SERPL: -0.84 SD

## 2023-10-03 NOTE — PROGRESS NOTES
[facility] Neurology Follow Up Office Visit Note    Initial Visit Date: 3/29/2023  Last Visit Date: 3/29/2023  Current Visit Date:  10/03/2023    Chief Complaint:     No chief complaint on file.      History of Present Illness:      This is 33 y.o. female with history of  anxiety disorder, depression, OCD who is referred for chronic migraine with visual scotoma.  Also states that she has a pituitary microadenoma.  Will also need to rule out hyperthyroidism given exaggerated physiological tremor.  During last visit, propanolol 20 mg twice a day was started.  CBC, CMP, TSH, free T4, T3, FSH, and LH level was ordered.  Patient was pending endocrine evaluation that time.     Age of Onset: 11 years old      Headache Description: bitemporal, pounding, severe, lasting at least 4 hours, with nausea, with photophobia and phonophobia. + right visual scotoma.     Frequency:  At least 15 migraine headache days per month since 11 years old.      Provocation Factors: denied     Risk Factors  - Family history of headache disorder: Yes mom and sister with headache disorder.   - History of focal CNS lesions: Yes pituitary adenoma.  - History of CNS infections: No  - History head trauma: No  - History of underlying mood disorder: Yes anxiety disorder, depression, OC  - History of sleep disorder: Yes not sleeping well.   - Recreational drug use: No  - Tobacco use: No  - Alcohol use: No  - Weight fluctuation: Yes fluctuate   - Isotretinoin or Tetracycline use:  No  - Family planning and contraceptive use: irregular menses.     Medications:     Current Prophylactic  Wellbutrin 150 mg twice a day (9/30/2022 to present)  Paroxetine 10 mg once a day (9/30/2022 to present)  Propanolol 20 mg twice a day (3/29/2023 to present)     Current Abortive  Zofran 8 mg twice a day as needed (10/10/2022 to present)  Rizatriptan 10 mg twice a day as needed (10/10/2022 to present): ineffective.  Not taking.     Prior Prophylactic  Denied      Prior  Abortive  Denied        Devices:     - VNS:  - TNS  - TMS:     Procedures:     - Botox:  - PSG block:   - Occipital nerve block:     Labs:     Results for orders placed or performed in visit on 09/28/23   T4, Free   Result Value Ref Range    Thyroxine Free 0.92 0.70 - 1.48 ng/dL   TSH   Result Value Ref Range    TSH 1.027 0.350 - 4.940 uIU/mL   Estradiol   Result Value Ref Range    Estradiol Level 54 pg/mL   Follicle Stimulating Hormone   Result Value Ref Range    Follicle Stimulating Hormone 7.86 mIU/mL   Luteinizing Hormone   Result Value Ref Range    Luteinizing Hormone 2.62 mIU/mL   Basic Metabolic Panel   Result Value Ref Range    Sodium Level 139 136 - 145 mmol/L    Potassium Level 4.8 3.5 - 5.1 mmol/L    Chloride 106 98 - 107 mmol/L    Carbon Dioxide 24 22 - 29 mmol/L    Glucose Level 86 74 - 100 mg/dL    Blood Urea Nitrogen 8.5 7.0 - 18.7 mg/dL    Creatinine 0.85 0.55 - 1.02 mg/dL    BUN/Creatinine Ratio 10     Calcium Level Total 10.2 8.4 - 10.2 mg/dL    Anion Gap 9.0 mEq/L    eGFR >60 mls/min/1.73/m2   Insulin-Like Growth Factor   Result Value Ref Range    IGF-1, LC/MS, S 103 59 - 279 ng/mL    Z-score -0.84 -2.0 - 2.0 SD   Cortisol   Result Value Ref Range    Cortisol Level 17.3 ug/dL   ACTH   Result Value Ref Range    Adrenocorticotropic Hormone, P 43 pg/mL       Studies:     - [facility] Brain with and without contrast pituitary [facility]:  As per documentation, 6 mm hypoenhancing lesion consistent with pituitary microadenoma.    - MRA Head w/o Titus:   - MRV Head w/o Titus:   - NCHCT:  - Lumbar Puncture:    Review of Systems:     Review of Systems   All other systems reviewed and are negative.      Physical Exams:     Vitals:    10/05/23 1034   BP: 118/80   Pulse: 82   Resp: 12   Temp: 98.2 °F (36.8 °C)       Physical Exam  Vitals and nursing note reviewed.   Constitutional:       Appearance: Normal appearance.   HENT:      Head: Normocephalic and atraumatic.      Nose: Nose normal.      Mouth/Throat:       Mouth: Mucous membranes are moist.      Pharynx: Oropharynx is clear.   Eyes:      Conjunctiva/sclera: Conjunctivae normal.   Cardiovascular:      Rate and Rhythm: Normal rate and regular rhythm.      Pulses: Normal pulses.   Pulmonary:      Effort: Pulmonary effort is normal.      Breath sounds: Normal breath sounds.   Abdominal:      General: Abdomen is flat.   Musculoskeletal:         General: Normal range of motion.      Cervical back: Normal range of motion.   Skin:     General: Skin is warm.   Neurological:      Mental Status: She is alert.         Comprehensive Neurological Exam:  Mental Status: Alert Oriented to Self, Date, and Place. Comprehension wnl. No dysarthria.   CN II - XII: FELICIA, No APD, Fundus wnl OU, VFFC, No ptosis OU, EOMI without nystagmus LT/Temp symmetric in CN V1-3 distribution, Hearing grossly intact, Face Symmetric, Tongue and Uvula midline, Trapezius symmetric bilateral.   Motor: tone and bulk wnl throughout, fine high-frequency postural tremor in bilateral upper extremity, 5/5 to confrontation, Fine finger movements wnl b/l, No pronator drift.   Sensory: LT, Proprioception, Vibration, PP, Temp symmetric. No sensory simultagnosia.   Reflexes: 2+ throughout  Cerebellar: FNF wnl b/l, RAHM wnl b/l  Romberg: Negative  Gait: normal.    Assessment:     This is 33 y.o. female with history of anxiety disorder, depression, OCD who is referred for chronic migraine with visual scotoma.  Also states that she has a pituitary microadenoma.  Will also need to rule out hyperthyroidism given exaggerated physiological tremor.    Problem List Items Addressed This Visit    None      Plan:     [] propranolol 20 mg twice a day  [] Continue with rizatriptan 10 mg twice a day as needed    RTC ***    Headache education provided: good sleep hygiene and 7 hours of sleep per night, stress management, medication overuse education provided. Using more 3 OTC per week may worsen headaches, high intensity interval  training has shown to reduce headache frequency. Low carb, high protein has shown to reduce headache frequency. Patient is instructed in keep headache diary.     I have explained the treatment plan, diagnosis, and prognosis to patient. All questions are answered to the best of my knowledge.     Face to face time *** minutes, including documentation, chart review, counseling, education, review of test results, relevant medical records, and coordination of care.       Anabel Silvestre MD   General Neurology  10/03/2023

## 2023-10-05 ENCOUNTER — OFFICE VISIT (OUTPATIENT)
Dept: NEUROLOGY | Facility: CLINIC | Age: 33
End: 2023-10-05
Payer: MEDICAID

## 2023-10-05 ENCOUNTER — LAB VISIT (OUTPATIENT)
Dept: LAB | Facility: HOSPITAL | Age: 33
End: 2023-10-05
Attending: INTERNAL MEDICINE
Payer: MEDICAID

## 2023-10-05 VITALS
DIASTOLIC BLOOD PRESSURE: 80 MMHG | HEIGHT: 64 IN | OXYGEN SATURATION: 100 % | BODY MASS INDEX: 31.41 KG/M2 | WEIGHT: 184 LBS | HEART RATE: 82 BPM | RESPIRATION RATE: 12 BRPM | TEMPERATURE: 98 F | SYSTOLIC BLOOD PRESSURE: 118 MMHG

## 2023-10-05 DIAGNOSIS — G43.709 CHRONIC MIGRAINE WITHOUT AURA WITHOUT STATUS MIGRAINOSUS, NOT INTRACTABLE: ICD-10-CM

## 2023-10-05 DIAGNOSIS — D35.2 PITUITARY ADENOMA: ICD-10-CM

## 2023-10-05 DIAGNOSIS — F41.9 ANXIETY DISORDER, UNSPECIFIED TYPE: ICD-10-CM

## 2023-10-05 DIAGNOSIS — D35.2 PITUITARY MICROADENOMA: ICD-10-CM

## 2023-10-05 LAB — CORTIS SERPL-SCNC: <1 UG/DL

## 2023-10-05 PROCEDURE — 1160F PR REVIEW ALL MEDS BY PRESCRIBER/CLIN PHARMACIST DOCUMENTED: ICD-10-PCS | Mod: CPTII,,, | Performed by: PSYCHIATRY & NEUROLOGY

## 2023-10-05 PROCEDURE — 3079F DIAST BP 80-89 MM HG: CPT | Mod: CPTII,,, | Performed by: PSYCHIATRY & NEUROLOGY

## 2023-10-05 PROCEDURE — 1159F MED LIST DOCD IN RCRD: CPT | Mod: CPTII,,, | Performed by: PSYCHIATRY & NEUROLOGY

## 2023-10-05 PROCEDURE — 3079F PR MOST RECENT DIASTOLIC BLOOD PRESSURE 80-89 MM HG: ICD-10-PCS | Mod: CPTII,,, | Performed by: PSYCHIATRY & NEUROLOGY

## 2023-10-05 PROCEDURE — 80299 QUANTITATIVE ASSAY DRUG: CPT

## 2023-10-05 PROCEDURE — 36415 COLL VENOUS BLD VENIPUNCTURE: CPT

## 2023-10-05 PROCEDURE — 3074F PR MOST RECENT SYSTOLIC BLOOD PRESSURE < 130 MM HG: ICD-10-PCS | Mod: CPTII,,, | Performed by: PSYCHIATRY & NEUROLOGY

## 2023-10-05 PROCEDURE — 99214 OFFICE O/P EST MOD 30 MIN: CPT | Mod: PBBFAC | Performed by: PSYCHIATRY & NEUROLOGY

## 2023-10-05 PROCEDURE — 82533 TOTAL CORTISOL: CPT

## 2023-10-05 PROCEDURE — 3074F SYST BP LT 130 MM HG: CPT | Mod: CPTII,,, | Performed by: PSYCHIATRY & NEUROLOGY

## 2023-10-05 PROCEDURE — 99214 PR OFFICE/OUTPT VISIT, EST, LEVL IV, 30-39 MIN: ICD-10-PCS | Mod: S$PBB,,, | Performed by: PSYCHIATRY & NEUROLOGY

## 2023-10-05 PROCEDURE — 3008F PR BODY MASS INDEX (BMI) DOCUMENTED: ICD-10-PCS | Mod: CPTII,,, | Performed by: PSYCHIATRY & NEUROLOGY

## 2023-10-05 PROCEDURE — 1159F PR MEDICATION LIST DOCUMENTED IN MEDICAL RECORD: ICD-10-PCS | Mod: CPTII,,, | Performed by: PSYCHIATRY & NEUROLOGY

## 2023-10-05 PROCEDURE — 1160F RVW MEDS BY RX/DR IN RCRD: CPT | Mod: CPTII,,, | Performed by: PSYCHIATRY & NEUROLOGY

## 2023-10-05 PROCEDURE — 3008F BODY MASS INDEX DOCD: CPT | Mod: CPTII,,, | Performed by: PSYCHIATRY & NEUROLOGY

## 2023-10-05 PROCEDURE — 99214 OFFICE O/P EST MOD 30 MIN: CPT | Mod: S$PBB,,, | Performed by: PSYCHIATRY & NEUROLOGY

## 2023-10-05 RX ORDER — RIZATRIPTAN BENZOATE 10 MG/1
10 TABLET, ORALLY DISINTEGRATING ORAL 2 TIMES DAILY PRN
Qty: 9 TABLET | Refills: 4 | Status: SHIPPED | OUTPATIENT
Start: 2023-10-05 | End: 2023-11-04

## 2023-10-05 RX ORDER — PROPRANOLOL HYDROCHLORIDE 60 MG/1
60 CAPSULE, EXTENDED RELEASE ORAL NIGHTLY
Qty: 30 CAPSULE | Refills: 3 | Status: SHIPPED | OUTPATIENT
Start: 2023-10-05 | End: 2024-03-06 | Stop reason: SDUPTHER

## 2023-10-05 NOTE — PROGRESS NOTES
[facility] Neurology Follow Up Office Visit Note    Initial Visit Date: 3/29/2023  Last Visit Date: 3/29/2023  Current Visit Date:  10/05/2023    Chief Complaint:     Chief Complaint   Patient presents with    Headache     States propranolol helped with headaches in the beginning-headaches have returned, but medication helps with heart and anxiety; Needs refill         History of Present Illness:      This is 33 y.o. female with history of  anxiety disorder, depression, OCD who is referred for chronic migraine with visual scotoma.  Also states that she has a pituitary microadenoma.  Will also need to rule out hyperthyroidism given exaggerated physiological tremor.  During last visit, propanolol 20 mg twice a day was started.  CBC, CMP, TSH, free T4, T3, FSH, and LH level was ordered.  Patient was pending endocrine evaluation that time. Patient states that she has been taking propoanlol and has helped with migraines and anxiety.  Patient has been off medication for the past month due to her running out and missing her last appointment.  Patient states that when she is on propranolol her headache is 4-5/10 and when she is not on propranolol her headache is 10/10.  Patient is now having 2-3 headaches a week.  Patient states that she has noticed weight gain and occasional lightheadedness with propanolol.     Age of Onset: 11 years old      Headache Description: bitemporal, pounding, severe, lasting at least 4 hours, with nausea, with photophobia and phonophobia. + right visual scotoma.     Frequency:  At least 15 migraine headache days per month since 11 years old.  Since been on propranolol spent about 10-12 per month     Provocation Factors:  Stress     Risk Factors  - Family history of headache disorder: Yes mom and sister with headache disorder.   - History of focal CNS lesions: Yes pituitary adenoma.  - History of CNS infections: No  - History head trauma: No  - History of underlying mood disorder: Yes anxiety disorder,  depression, OC  - History of sleep disorder: Yes not sleeping well.   - Recreational drug use: No  - Tobacco use: No  - Alcohol use: No  - Weight fluctuation: Yes fluctuate   - Isotretinoin or Tetracycline use:  No  - Family planning and contraceptive use: irregular menses.     Medications:     Current Prophylactic  Wellbutrin 150 mg twice a day (9/30/2022 to present)  Paroxetine 10 mg once a day (9/30/2022 to present)  Propanolol 20 mg twice a day (3/29/2023 to present)     Current Abortive  Zofran 8 mg twice a day as needed (10/10/2022 to present)  Rizatriptan 10 mg twice a day as needed (10/10/2022 to present): ineffective.  Not taking.     Prior Prophylactic  Denied      Prior Abortive  Denied        Devices:     - VNS:  - TNS:  - TMS:     Procedures:     - Botox:  - PSG block:   - Occipital nerve block:     Labs:     Results for orders placed or performed in visit on 09/28/23   T4, Free   Result Value Ref Range    Thyroxine Free 0.92 0.70 - 1.48 ng/dL   TSH   Result Value Ref Range    TSH 1.027 0.350 - 4.940 uIU/mL   Estradiol   Result Value Ref Range    Estradiol Level 54 pg/mL   Follicle Stimulating Hormone   Result Value Ref Range    Follicle Stimulating Hormone 7.86 mIU/mL   Luteinizing Hormone   Result Value Ref Range    Luteinizing Hormone 2.62 mIU/mL   Basic Metabolic Panel   Result Value Ref Range    Sodium Level 139 136 - 145 mmol/L    Potassium Level 4.8 3.5 - 5.1 mmol/L    Chloride 106 98 - 107 mmol/L    Carbon Dioxide 24 22 - 29 mmol/L    Glucose Level 86 74 - 100 mg/dL    Blood Urea Nitrogen 8.5 7.0 - 18.7 mg/dL    Creatinine 0.85 0.55 - 1.02 mg/dL    BUN/Creatinine Ratio 10     Calcium Level Total 10.2 8.4 - 10.2 mg/dL    Anion Gap 9.0 mEq/L    eGFR >60 mls/min/1.73/m2   Insulin-Like Growth Factor   Result Value Ref Range    IGF-1, LC/MS, S 103 59 - 279 ng/mL    Z-score -0.84 -2.0 - 2.0 SD   Cortisol   Result Value Ref Range    Cortisol Level 17.3 ug/dL   ACTH   Result Value Ref Range     Adrenocorticotropic Hormone, P 43 pg/mL       Studies:     - MRI Brain with and without contrast pituitary protocol at [facility]:  As per documentation, 6 mm hypoenhancing lesion consistent with pituitary microadenoma.    - MRA Head w/o Titus:   - MRV Head w/o Titus:   - NCHCT:  - Lumbar Puncture:    Review of Systems:     Review of Systems   All other systems reviewed and are negative.      Physical Exams:     Vitals:    10/05/23 1034   BP: 118/80   Pulse: 82   Resp: 12   Temp: 98.2 °F (36.8 °C)       Physical Exam  Vitals and nursing note reviewed.   Constitutional:       Appearance: Normal appearance.   HENT:      Head: Normocephalic and atraumatic.      Nose: Nose normal.      Mouth/Throat:      Mouth: Mucous membranes are moist.      Pharynx: Oropharynx is clear.   Eyes:      Conjunctiva/sclera: Conjunctivae normal.   Cardiovascular:      Rate and Rhythm: Normal rate and regular rhythm.      Pulses: Normal pulses.   Pulmonary:      Effort: Pulmonary effort is normal.      Breath sounds: Normal breath sounds.   Abdominal:      General: Abdomen is flat.   Musculoskeletal:         General: Normal range of motion.      Cervical back: Normal range of motion.   Skin:     General: Skin is warm.   Neurological:      Mental Status: She is alert.         Comprehensive Neurological Exam:  Mental Status: Alert Oriented to Self, Date, and Place. Comprehension wnl. No dysarthria.   CN II - XII: FELICIA, No APD, Fundus wnl OU, VFFC, No ptosis OU, EOMI without nystagmus LT/Temp symmetric in CN V1-3 distribution, Hearing grossly intact, Face Symmetric, Tongue and Uvula midline, Trapezius symmetric bilateral.   Motor: tone and bulk wnl throughout,  5/5 to confrontation, Fine finger movements wnl b/l, No pronator drift.   Sensory: LT, Proprioception, Vibration, PP, Temp symmetric. No sensory simultagnosia.   Reflexes: 2+ throughout  Cerebellar: FNF wnl b/l, RAHM wnl b/l  Romberg: Negative  Gait: normal.    Assessment:     This is 33 y.o.  female with history of anxiety disorder, depression, OCD who is referred for chronic migraine with visual scotoma.  Also states that she has a pituitary microadenoma.  Will also need to rule out hyperthyroidism given exaggerated physiological tremor.     Problem List Items Addressed This Visit          Neuro    Chronic migraine without aura without status migrainosus, not intractable    Relevant Medications    propranoloL (INDERAL LA) 60 MG 24 hr capsule    rizatriptan (MAXALT-MLT) 10 MG disintegrating tablet       Psychiatric    Anxiety disorder    Relevant Medications    propranoloL (INDERAL LA) 60 MG 24 hr capsule    rizatriptan (MAXALT-MLT) 10 MG disintegrating tablet       Endocrine    Pituitary adenoma    Relevant Medications    propranoloL (INDERAL LA) 60 MG 24 hr capsule    rizatriptan (MAXALT-MLT) 10 MG disintegrating tablet       Plan:     [] increase propranolol to 60 mg a day  [] Continue with rizatriptan 10 mg twice a day as needed    RTC 3 months    Headache education provided: good sleep hygiene and 7 hours of sleep per night, stress management, medication overuse education provided. Using more 3 OTC per week may worsen headaches, high intensity interval training has shown to reduce headache frequency. Low carb, high protein has shown to reduce headache frequency. Patient is instructed in keep headache diary.     I have explained the treatment plan, diagnosis, and prognosis to patient. All questions are answered to the best of my knowledge.     Face to face time 30 minutes, including documentation, chart review, counseling, education, review of test results, relevant medical records, and coordination of care.       Geoffrey Parrish MD   10/05/2023

## 2023-10-13 LAB — MAYO GENERIC ORDERABLE RESULT: NORMAL

## 2023-10-19 ENCOUNTER — HOSPITAL ENCOUNTER (OUTPATIENT)
Dept: RADIOLOGY | Facility: HOSPITAL | Age: 33
Discharge: HOME | End: 2023-10-19
Attending: INTERNAL MEDICINE
Payer: MEDICAID

## 2023-10-19 DIAGNOSIS — E04.2 MULTINODULAR GOITER: ICD-10-CM

## 2023-10-19 DIAGNOSIS — N92.6 IRREGULAR PERIODS/MENSTRUAL CYCLES: ICD-10-CM

## 2023-10-19 DIAGNOSIS — E55.9 VITAMIN D DEFICIENCY: ICD-10-CM

## 2023-10-19 DIAGNOSIS — D35.2 PITUITARY MICROADENOMA: ICD-10-CM

## 2023-10-19 DIAGNOSIS — H53.9 VISION CHANGES: ICD-10-CM

## 2023-10-19 PROCEDURE — 25500020 PHARM REV CODE 255

## 2023-10-19 PROCEDURE — A9577 INJ MULTIHANCE: HCPCS

## 2023-10-19 PROCEDURE — 70553 MRI BRAIN STEM W/O & W/DYE: CPT | Mod: TC

## 2023-10-19 RX ADMIN — GADOBENATE DIMEGLUMINE 18 ML: 529 INJECTION, SOLUTION INTRAVENOUS at 10:10

## 2023-10-20 NOTE — TELEPHONE ENCOUNTER
Everything is back.  Appears pt has a nonsecretory pituitary microadenoma.    Recommend pt continue plan per neurology for her neurological symptoms.    Regarding her irregular cycles, ok to offer to refer to [facility] clinic to further eval and tx.    Regarding her thyroid and vit d, ok to push out f/u if she doesn't have any further questions to 4/24 w/ u/s prior to regroup and sort further labs that day to f/u accordingly.

## 2023-11-28 ENCOUNTER — HOSPITAL ENCOUNTER (OUTPATIENT)
Dept: RADIOLOGY | Facility: HOSPITAL | Age: 33
Discharge: HOME | End: 2023-11-28
Attending: INTERNAL MEDICINE
Payer: MEDICAID

## 2023-11-28 DIAGNOSIS — E04.2 MULTINODULAR GOITER: ICD-10-CM

## 2023-11-28 PROCEDURE — 76536 US EXAM OF HEAD AND NECK: CPT | Mod: TC

## 2023-11-29 NOTE — TELEPHONE ENCOUNTER
U/s was just done and appears f/u not pushed back to 4/24.  Will discuss at still booked f/u 1/24.

## 2024-03-06 DIAGNOSIS — G43.709 CHRONIC MIGRAINE WITHOUT AURA WITHOUT STATUS MIGRAINOSUS, NOT INTRACTABLE: ICD-10-CM

## 2024-03-06 DIAGNOSIS — F41.9 ANXIETY DISORDER, UNSPECIFIED TYPE: ICD-10-CM

## 2024-03-06 DIAGNOSIS — D35.2 PITUITARY ADENOMA: ICD-10-CM

## 2024-03-07 RX ORDER — PROPRANOLOL HYDROCHLORIDE 60 MG/1
60 CAPSULE, EXTENDED RELEASE ORAL NIGHTLY
Qty: 30 CAPSULE | Refills: 3 | Status: SHIPPED | OUTPATIENT
Start: 2024-03-07 | End: 2024-07-05

## 2024-04-30 NOTE — PROGRESS NOTES
[facility] Neurology Follow Up Office Visit Note    Initial Visit Date: 3/29/2023  Initial Visit Date: 10/5/2023  Current Visit Date:  04/30/2024    Chief Complaint:     No chief complaint on file.      History of Present Illness:      This is 33 y.o. female with history of anxiety disorder, OCD, depression, pituitary microadenoma who is referred for chronic migraine with visual scotoma.  During last visit, propranolol was increased to extended release 60 mg daily.    Age of Onset: 11 years old     Headache Description: bitemporal, pounding, severe, lasting at least 4 hours, with nausea, with photophobia and phonophobia. + right on visual scotoma.    Frequency:  At least 8 migraine headache days per month    Provocation Factors: denied    Risk Factors  - Family history of headache disorder: Yes mom and sister with headache disorder.   - History of focal CNS lesions: Yes pituitary adenoma.  - History of CNS infections: No  - History head trauma: No  - History of underlying mood disorder: Yes anxiety disorder better controlled.  - History of sleep disorder: Yes not sleeping well.   - Recreational drug use: No  - Tobacco use: No  - Alcohol use: No  - Weight fluctuation: Yes fluctuate   - Isotretinoin or Tetracycline use:  No  - Family planning and contraceptive use: irregular menses.     Medications:     Current Prophylactic  Wellbutrin 150 mg twice a day (9/30/2022 to present)  Paroxetine 10 mg once a day (9/30/2022 to present)  Propranolol extended release 60 mg daily (10/5/2023 to present)    Current Abortive  Zofran 8 mg twice a day as needed (10/10/2022 to present)  Rizatriptan 10 mg twice a day as needed (10/10/2022 to present)    Prior Prophylactic  Propanolol 20 mg twice a day (3/29/2023 to 10/5/2023): effective.  Ran out of refills.      Prior Abortive  Denied     Devices:     - VNS:  - TNS  - TMS:     Procedures:     - Botox:  - PSG block:   - Occipital nerve block:     Labs:     Results for orders placed or  performed in visit on 10/05/23   Cortisol   Result Value Ref Range    Cortisol Level <1.0 ug/dL   [place] GENERIC ORDERABLE   Result Value Ref Range    [place] Generic Orderable SEE COMMENTS        Studies:     - MRI pituitary with and without contrast 10/19/2023: Findings seen consistent with 6.7 x 7 mm pituitary adenoma in the right-sided pituitary   - MRI Brain with and without contrast pituitary protocol at [facility]:  As per documentation, 6 mm hypoenhancing lesion consistent with pituitary microadenoma.    - MRA Head w/o Titus:   - MRV Head w/o Titus:   - NCHCT:  - Lumbar Puncture:    Review of Systems:     Review of Systems   Constitutional:  Positive for diaphoresis and malaise/fatigue.   Cardiovascular:  Positive for palpitations.   Neurological:  Positive for tingling and tremors.   All other systems reviewed and are negative.      Physical Exams:     There were no vitals filed for this visit.      Physical Exam  Vitals and nursing note reviewed.   Constitutional:       Appearance: Normal appearance.   HENT:      Head: Normocephalic and atraumatic.      Nose: Nose normal.      Mouth/Throat:      Mouth: Mucous membranes are moist.      Pharynx: Oropharynx is clear.   Eyes:      Conjunctiva/sclera: Conjunctivae normal.   Cardiovascular:      Rate and Rhythm: Normal rate and regular rhythm.      Pulses: Normal pulses.   Pulmonary:      Effort: Pulmonary effort is normal.      Breath sounds: Normal breath sounds.   Abdominal:      General: Abdomen is flat.   Musculoskeletal:         General: Normal range of motion.      Cervical back: Normal range of motion.   Skin:     General: Skin is warm.   Neurological:      Mental Status: She is alert.         Comprehensive Neurological Exam:  Mental Status: Alert Oriented to Self, Date, and Place. Comprehension wnl. No dysarthria.   CN II - XII: FELICIA, No APD, Fundus wnl OU, VFFC, No ptosis OU, EOMI without nystagmus LT/Temp symmetric in CN V1-3 distribution, Hearing grossly  intact, Face Symmetric, Tongue and Uvula midline, Trapezius symmetric bilateral.   Motor: tone and bulk wnl throughout, fine high-frequency postural tremor in bilateral upper extremity, 5/5 to confrontation, Fine finger movements wnl b/l, No pronator drift.   Sensory: LT, Proprioception, Vibration, PP, Temp symmetric. No sensory simultagnosia.   Reflexes: 2+ throughout  Cerebellar: FNF wnl b/l, RAHM wnl b/l  Romberg: Negative  Gait: normal.    Assessment:     This is 33 y.o. female with history of anxiety disorder, depression, OCD who is referred for chronic migraine with visual scotoma.  Also states that she has a pituitary microadenoma.    Problem List Items Addressed This Visit    None        Plan:     [] propranolol 20 mg twice a day  [] Continue with rizatriptan 10 mg twice a day as needed    RTC 3 months    Headache education provided: good sleep hygiene and 7 hours of sleep per night, stress management, medication overuse education provided. Using more 3 OTC per week may worsen headaches, high intensity interval training has shown to reduce headache frequency. Low carb, high protein has shown to reduce headache frequency. Patient is instructed in keep headache diary.     I have explained the treatment plan, diagnosis, and prognosis to patient. All questions are answered to the best of my knowledge.     Face to face time 60 minutes, including documentation, chart review, counseling, education, review of test results, relevant medical records, and coordination of care.       Anabel Silvestre MD   General Neurology  04/30/2024

## 2024-05-02 ENCOUNTER — OFFICE VISIT (OUTPATIENT)
Dept: NEUROLOGY | Facility: CLINIC | Age: 34
End: 2024-05-02
Payer: MEDICAID

## 2024-05-02 VITALS
SYSTOLIC BLOOD PRESSURE: 116 MMHG | HEIGHT: 64 IN | TEMPERATURE: 98 F | HEART RATE: 66 BPM | RESPIRATION RATE: 14 BRPM | DIASTOLIC BLOOD PRESSURE: 82 MMHG | BODY MASS INDEX: 32.48 KG/M2 | OXYGEN SATURATION: 100 % | WEIGHT: 190.25 LBS

## 2024-05-02 DIAGNOSIS — F41.9 ANXIETY DISORDER, UNSPECIFIED TYPE: ICD-10-CM

## 2024-05-02 DIAGNOSIS — G43.909 MIGRAINE WITHOUT STATUS MIGRAINOSUS, NOT INTRACTABLE, UNSPECIFIED MIGRAINE TYPE: Primary | ICD-10-CM

## 2024-05-02 DIAGNOSIS — F42.9 OBSESSIVE-COMPULSIVE DISORDER, UNSPECIFIED TYPE: ICD-10-CM

## 2024-05-02 PROCEDURE — 1159F MED LIST DOCD IN RCRD: CPT | Mod: CPTII,,, | Performed by: PSYCHIATRY & NEUROLOGY

## 2024-05-02 PROCEDURE — 99214 OFFICE O/P EST MOD 30 MIN: CPT | Mod: S$PBB,,, | Performed by: PSYCHIATRY & NEUROLOGY

## 2024-05-02 PROCEDURE — 99213 OFFICE O/P EST LOW 20 MIN: CPT | Mod: PBBFAC | Performed by: PSYCHIATRY & NEUROLOGY

## 2024-05-02 PROCEDURE — G2211 COMPLEX E/M VISIT ADD ON: HCPCS | Mod: S$PBB,,, | Performed by: PSYCHIATRY & NEUROLOGY

## 2024-05-02 PROCEDURE — 3074F SYST BP LT 130 MM HG: CPT | Mod: CPTII,,, | Performed by: PSYCHIATRY & NEUROLOGY

## 2024-05-02 PROCEDURE — 3079F DIAST BP 80-89 MM HG: CPT | Mod: CPTII,,, | Performed by: PSYCHIATRY & NEUROLOGY

## 2024-05-02 PROCEDURE — 3008F BODY MASS INDEX DOCD: CPT | Mod: CPTII,,, | Performed by: PSYCHIATRY & NEUROLOGY

## 2024-05-02 RX ORDER — SUMATRIPTAN SUCCINATE 100 MG/1
100 TABLET ORAL
Qty: 9 TABLET | Refills: 4 | Status: SHIPPED | OUTPATIENT
Start: 2024-05-02 | End: 2024-06-01

## 2024-05-02 NOTE — PROGRESS NOTES
[facility] Neurology Follow Up Office Visit Note    Initial Visit Date: 3/29/2023  Initial Visit Date: 10/5/2023  Current Visit Date:  05/02/2024    Chief Complaint:     Chief Complaint   Patient presents with    Headache     States increase in propranolol has helped--having about 5 migraines/month; intensity not as bad; Needs refill on Rizatriptan and Zofran       History of Present Illness:      This is 33 y.o. female with history of anxiety disorder, OCD, depression, pituitary microadenoma who is referred for chronic migraine with visual scotoma.  This is a six-month follow-up. Patient reports compliancy with propanolol ER 60 mg nightly.  She reports since last visit her frequency of migraines per month has decreased from 17-18 migraines to 5.  She states that when she does have migraines she attempts to take rizatriptan, which works intermittently.  She reports that when the Triptan does not work she will take Tylenol with some relief.  Description of migraine:  Patient usually has gray vision with floaters, distinct smell, and nausea prior to the onset of migraines.  She states that sometimes migraines occur without aura.  They last 2-3 days.  She reports photophobia and lays down in a dark room with some relief.  She states that the migraine is mostly in the right temporal area, pounding, and severe.  No other associated symptoms.  She denies numbness, weakness, tingling, back pain, neck pain, and any other symptoms.  Of note, she reports discontinuing Wellbutrin and paroxetine 6 months ago for unknown reasons.    Age of Onset: 11 years old     Headache Description: bitemporal, pounding, severe, lasting at least 4 hours, with nausea, with photophobia and phonophobia. + right on visual scotoma.    Frequency:  At least 5 migraine headache days per month    Provocation Factors: denied    Risk Factors  - Family history of headache disorder: Yes mom and sister with headache disorder.   - History of focal CNS lesions:  Yes pituitary adenoma.  - History of CNS infections: No  - History head trauma: No  - History of underlying mood disorder: Yes anxiety disorder better controlled.  - History of sleep disorder: Yes not sleeping well.   - Recreational drug use: No  - Tobacco use: No  - Alcohol use: No  - Weight fluctuation: Yes fluctuate   - Isotretinoin or Tetracycline use:  No  - Family planning and contraceptive use: irregular menses.     Medications:     Current Prophylactic  Propranolol extended release 60 mg daily (10/5/2023 to present)    Current Abortive  Zofran 8 mg twice a day as needed (10/10/2022 to present)  Sumatriptan 100 mg BID (5/2/2024 to present)      Prior Prophylactic  Propanolol 20 mg twice a day (3/29/2023 to 10/5/2023): effective.  Ran out of refills.      Prior Abortive  Rizatriptan 10 mg twice a day as needed (10/10/2022 to 05/02/2024)     Devices:     - VNS:  - TNS  - TMS:     Procedures:     - Botox:  - PSG block:   - Occipital nerve block:     Labs:     Results for orders placed or performed in visit on 10/05/23   Cortisol   Result Value Ref Range    Cortisol Level <1.0 ug/dL   [place] GENERIC ORDERABLE   Result Value Ref Range    [place] Generic Orderable SEE COMMENTS        Studies:     - MRI pituitary with and without contrast 10/19/2023: Findings seen consistent with 6.7 x 7 mm pituitary adenoma in the right-sided pituitary   - MRI Brain with and without contrast pituitary protocol at [facility]:  As per documentation, 6 mm hypoenhancing lesion consistent with pituitary microadenoma.    - MRA Head w/o Titus:   - MRV Head w/o Titus:   - NCHCT:  - Lumbar Puncture:    Review of Systems:     Review of Systems   Constitutional:  Positive for diaphoresis and malaise/fatigue.   Eyes:  Positive for photophobia.   Cardiovascular:  Positive for palpitations.   Neurological:  Positive for tingling, tremors, sensory change and headaches.   All other systems reviewed and are negative.      Physical Exams:     Vitals:     05/02/24 0952   BP: 116/82   Pulse: 66   Resp: 14   Temp: 98.4 °F (36.9 °C)         Physical Exam  Vitals and nursing note reviewed.   Constitutional:       Appearance: Normal appearance.   HENT:      Head: Normocephalic and atraumatic.      Nose: Nose normal.      Mouth/Throat:      Mouth: Mucous membranes are moist.      Pharynx: Oropharynx is clear.   Eyes:      Conjunctiva/sclera: Conjunctivae normal.   Cardiovascular:      Rate and Rhythm: Normal rate and regular rhythm.      Pulses: Normal pulses.   Pulmonary:      Effort: Pulmonary effort is normal.      Breath sounds: Normal breath sounds.   Abdominal:      General: Abdomen is flat.   Musculoskeletal:         General: Normal range of motion.      Cervical back: Normal range of motion.   Skin:     General: Skin is warm.   Neurological:      Mental Status: She is alert.         Comprehensive Neurological Exam:  Mental Status: Alert Oriented to Self, Date, and Place. Comprehension wnl. No dysarthria.   CN II - XII: FELICIA, No APD, Fundus wnl OU, VFFC, No ptosis OU, EOMI without nystagmus LT/Temp symmetric in CN V1-3 distribution, Hearing grossly intact, Face Symmetric, Tongue and Uvula midline, Trapezius symmetric bilateral.   Motor: tone and bulk wnl throughout, fine high-frequency postural tremor in bilateral upper extremity, 5/5 to confrontation, Fine finger movements wnl b/l, No pronator drift.   Sensory: LT, Proprioception, Vibration, PP, Temp symmetric. No sensory simultagnosia.   Reflexes: 2+ throughout  Cerebellar: FNF wnl b/l, RAHM wnl b/l  Romberg: Negative  Gait: normal.    Assessment:     This is 33 y.o. female with history of anxiety disorder, depression, OCD who is referred for chronic migraine with visual scotoma.She is here for six-month follow-up..  Pituitary microadenoma followed by endocrinology    Problem List Items Addressed This Visit    None  Visit Diagnoses       Migraine without status migrainosus, not intractable, unspecified migraine  type    -  Primary    Relevant Medications    sumatriptan (IMITREX) 100 MG tablet              Plan:     [] Continue propranolol 60 mg ER nightly  [] Discontinue rizatriptan 10 mg  [] Start sumatriptan 100 mg b.i.d. as needed for migraines  [] Advised patient to make an appointment with psychiatrist in regards to OCD and antidepressant medications, which she discontinued on her own 6 months ago    RTC 3-4 months    Headache education provided: good sleep hygiene and 7 hours of sleep per night, stress management, medication overuse education provided. Using more 3 OTC per week may worsen headaches, high intensity interval training has shown to reduce headache frequency. Low carb, high protein has shown to reduce headache frequency. Patient is instructed in keep headache diary.     I have explained the treatment plan, diagnosis, and prognosis to patient. All questions are answered to the best of my knowledge.     Face to face time 60 minutes, including documentation, chart review, counseling, education, review of test results, relevant medical records, and coordination of care.       Jaiden Camacho MD   [facility] PGY-2  05/02/2024

## 2025-02-04 DIAGNOSIS — G43.709 CHRONIC MIGRAINE WITHOUT AURA WITHOUT STATUS MIGRAINOSUS, NOT INTRACTABLE: ICD-10-CM

## 2025-02-04 DIAGNOSIS — G43.909 MIGRAINE WITHOUT STATUS MIGRAINOSUS, NOT INTRACTABLE, UNSPECIFIED MIGRAINE TYPE: ICD-10-CM

## 2025-02-04 DIAGNOSIS — F41.9 ANXIETY DISORDER, UNSPECIFIED TYPE: ICD-10-CM

## 2025-02-04 DIAGNOSIS — D35.2 PITUITARY ADENOMA: ICD-10-CM

## 2025-02-04 RX ORDER — PROPRANOLOL HYDROCHLORIDE 60 MG/1
60 CAPSULE, EXTENDED RELEASE ORAL NIGHTLY
Qty: 30 CAPSULE | Refills: 3 | Status: SHIPPED | OUTPATIENT
Start: 2025-02-04 | End: 2025-06-04

## 2025-02-04 RX ORDER — SUMATRIPTAN SUCCINATE 100 MG/1
100 TABLET ORAL 2 TIMES DAILY PRN
Qty: 9 TABLET | Refills: 4 | Status: SHIPPED | OUTPATIENT
Start: 2025-02-04 | End: 2025-03-06

## 2025-04-14 ENCOUNTER — HOSPITAL ENCOUNTER (OUTPATIENT)
Dept: RADIOLOGY | Facility: HOSPITAL | Age: 35
Discharge: HOME | End: 2025-04-14
Attending: OBSTETRICS & GYNECOLOGY
Payer: MEDICAID

## 2025-04-14 DIAGNOSIS — R10.2 PELVIC PAIN: ICD-10-CM

## 2025-04-14 PROCEDURE — 76856 US EXAM PELVIC COMPLETE: CPT | Mod: TC

## 2025-04-23 ENCOUNTER — LAB VISIT (OUTPATIENT)
Dept: LAB | Facility: HOSPITAL | Age: 35
End: 2025-04-23
Attending: OBSTETRICS & GYNECOLOGY
Payer: MEDICAID

## 2025-04-23 DIAGNOSIS — R19.07 GENERALIZED INTRA-ABDOMINAL AND PELVIC SWELLING, MASS AND LUMP: ICD-10-CM

## 2025-04-23 DIAGNOSIS — N83.209 CYST OF OVARY, UNSPECIFIED LATERALITY: ICD-10-CM

## 2025-04-23 PROCEDURE — 36415 COLL VENOUS BLD VENIPUNCTURE: CPT

## 2025-04-23 PROCEDURE — 86304 IMMUNOASSAY TUMOR CA 125: CPT

## 2025-04-24 LAB — CANCER AG125 SERPL-ACNC: 19.4 UNIT/ML (ref 0–35)

## 2025-06-02 DIAGNOSIS — G43.909 MIGRAINE WITHOUT STATUS MIGRAINOSUS, NOT INTRACTABLE, UNSPECIFIED MIGRAINE TYPE: ICD-10-CM

## 2025-06-02 DIAGNOSIS — F41.9 ANXIETY DISORDER, UNSPECIFIED TYPE: ICD-10-CM

## 2025-06-02 DIAGNOSIS — D35.2 PITUITARY ADENOMA: ICD-10-CM

## 2025-06-02 DIAGNOSIS — G43.709 CHRONIC MIGRAINE WITHOUT AURA WITHOUT STATUS MIGRAINOSUS, NOT INTRACTABLE: ICD-10-CM

## 2025-06-02 RX ORDER — SUMATRIPTAN SUCCINATE 100 MG/1
100 TABLET ORAL 2 TIMES DAILY PRN
Qty: 9 TABLET | Refills: 1 | Status: SHIPPED | OUTPATIENT
Start: 2025-06-02 | End: 2025-07-02

## 2025-06-02 RX ORDER — PROPRANOLOL HYDROCHLORIDE 60 MG/1
60 CAPSULE, EXTENDED RELEASE ORAL NIGHTLY
Qty: 30 CAPSULE | Refills: 1 | Status: SHIPPED | OUTPATIENT
Start: 2025-06-02 | End: 2025-09-30

## 2025-06-24 ENCOUNTER — HOSPITAL ENCOUNTER (EMERGENCY)
Facility: HOSPITAL | Age: 35
Discharge: HOME | End: 2025-06-25
Attending: STUDENT IN AN ORGANIZED HEALTH CARE EDUCATION/TRAINING PROGRAM
Payer: MEDICAID

## 2025-06-24 DIAGNOSIS — R10.32 LEFT LOWER QUADRANT ABDOMINAL PAIN: Primary | ICD-10-CM

## 2025-06-24 LAB
ALBUMIN SERPL-MCNC: 3.7 G/DL (ref 3.5–5)
ALBUMIN/GLOB SERPL: 0.7 RATIO (ref 1.1–2)
ALP SERPL-CCNC: 99 UNIT/L (ref 40–150)
ALT SERPL-CCNC: 8 UNIT/L (ref 0–55)
ANION GAP SERPL CALC-SCNC: 10 MEQ/L
AST SERPL-CCNC: 11 UNIT/L (ref 11–45)
B-HCG UR QL: NEGATIVE
BACTERIA #/AREA URNS AUTO: ABNORMAL /HPF
BASOPHILS # BLD AUTO: 0.06 X10(3)/MCL
BASOPHILS NFR BLD AUTO: 0.5 %
BILIRUB SERPL-MCNC: 0.2 MG/DL
BILIRUB UR QL STRIP.AUTO: NEGATIVE
BUN SERPL-MCNC: 5.5 MG/DL (ref 7–18.7)
CALCIUM SERPL-MCNC: 9.7 MG/DL (ref 8.4–10.2)
CHLORIDE SERPL-SCNC: 108 MMOL/L (ref 98–107)
CLARITY UR: CLEAR
CO2 SERPL-SCNC: 22 MMOL/L (ref 22–29)
COLOR UR AUTO: ABNORMAL
CREAT SERPL-MCNC: 0.87 MG/DL (ref 0.55–1.02)
CREAT/UREA NIT SERPL: 6
EOSINOPHIL # BLD AUTO: 0.09 X10(3)/MCL (ref 0–0.9)
EOSINOPHIL NFR BLD AUTO: 0.7 %
ERYTHROCYTE [DISTWIDTH] IN BLOOD BY AUTOMATED COUNT: 14.6 % (ref 11.5–17)
GFR SERPLBLD CREATININE-BSD FMLA CKD-EPI: >60 ML/MIN/1.73/M2
GLOBULIN SER-MCNC: 5 GM/DL (ref 2.4–3.5)
GLUCOSE SERPL-MCNC: 92 MG/DL (ref 74–100)
GLUCOSE UR QL STRIP: NORMAL
HCT VFR BLD AUTO: 39.5 % (ref 37–47)
HGB BLD-MCNC: 11.5 G/DL (ref 12–16)
HGB UR QL STRIP: ABNORMAL
IMM GRANULOCYTES # BLD AUTO: 0.04 X10(3)/MCL (ref 0–0.04)
IMM GRANULOCYTES NFR BLD AUTO: 0.3 %
KETONES UR QL STRIP: NEGATIVE
LEUKOCYTE ESTERASE UR QL STRIP: 250
LYMPHOCYTES # BLD AUTO: 3.12 X10(3)/MCL (ref 0.6–4.6)
LYMPHOCYTES NFR BLD AUTO: 25.9 %
MCH RBC QN AUTO: 22.7 PG (ref 27–31)
MCHC RBC AUTO-ENTMCNC: 29.1 G/DL (ref 33–36)
MCV RBC AUTO: 77.9 FL (ref 80–94)
MONOCYTES # BLD AUTO: 0.72 X10(3)/MCL (ref 0.1–1.3)
MONOCYTES NFR BLD AUTO: 6 %
MUCOUS THREADS URNS QL MICRO: ABNORMAL /LPF
NEUTROPHILS # BLD AUTO: 8.03 X10(3)/MCL (ref 2.1–9.2)
NEUTROPHILS NFR BLD AUTO: 66.6 %
NITRITE UR QL STRIP: NEGATIVE
NRBC BLD AUTO-RTO: 0 %
PH UR STRIP: 5.5 [PH]
PLATELET # BLD AUTO: 456 X10(3)/MCL (ref 130–400)
PMV BLD AUTO: 9.1 FL (ref 7.4–10.4)
POTASSIUM SERPL-SCNC: 3.7 MMOL/L (ref 3.5–5.1)
PROT SERPL-MCNC: 8.7 GM/DL (ref 6.4–8.3)
PROT UR QL STRIP: NEGATIVE
RBC # BLD AUTO: 5.07 X10(6)/MCL (ref 4.2–5.4)
RBC #/AREA URNS AUTO: ABNORMAL /HPF
SODIUM SERPL-SCNC: 140 MMOL/L (ref 136–145)
SP GR UR STRIP.AUTO: 1.01 (ref 1–1.03)
SQUAMOUS #/AREA URNS LPF: ABNORMAL /HPF
UROBILINOGEN UR STRIP-ACNC: NORMAL
WBC # BLD AUTO: 12.06 X10(3)/MCL (ref 4.5–11.5)
WBC #/AREA URNS AUTO: ABNORMAL /HPF

## 2025-06-24 PROCEDURE — 81025 URINE PREGNANCY TEST: CPT

## 2025-06-24 PROCEDURE — 99285 EMERGENCY DEPT VISIT HI MDM: CPT | Mod: 25

## 2025-06-24 PROCEDURE — 81001 URINALYSIS AUTO W/SCOPE: CPT

## 2025-06-24 PROCEDURE — 80053 COMPREHEN METABOLIC PANEL: CPT

## 2025-06-24 PROCEDURE — 85025 COMPLETE CBC W/AUTO DIFF WBC: CPT

## 2025-06-25 VITALS
SYSTOLIC BLOOD PRESSURE: 126 MMHG | WEIGHT: 192 LBS | HEIGHT: 63 IN | OXYGEN SATURATION: 100 % | DIASTOLIC BLOOD PRESSURE: 80 MMHG | HEART RATE: 74 BPM | TEMPERATURE: 98 F | RESPIRATION RATE: 19 BRPM | BODY MASS INDEX: 34.02 KG/M2

## 2025-06-25 PROCEDURE — 25500020 PHARM REV CODE 255: Performed by: NURSE PRACTITIONER

## 2025-06-25 RX ORDER — DICYCLOMINE HYDROCHLORIDE 20 MG/1
20 TABLET ORAL 2 TIMES DAILY PRN
Qty: 20 TABLET | Refills: 0 | Status: SHIPPED | OUTPATIENT
Start: 2025-06-25 | End: 2025-07-05

## 2025-06-25 RX ADMIN — IOHEXOL 100 ML: 350 INJECTION, SOLUTION INTRAVENOUS at 12:06

## 2025-06-25 NOTE — ED PROVIDER NOTES
Encounter Date: 6/24/2025       History     Chief Complaint   Patient presents with    Abdominal Pain     Presents with LLQ abd pain w/ vomiting and bowel changes x 2-3 days. Has a known L ovarian cyst, first diagnosed in April 2025, told to report to ED for worsening pain by [facility] office.      See MDM    The history is provided by the patient. No  was used.     Review of patient's allergies indicates:  No Known Allergies  Past Medical History:   Diagnosis Date    Brain tumor     Headache      Past Surgical History:   Procedure Laterality Date    CHOLECYSTECTOMY      right ovary removal       Family History   Problem Relation Name Age of Onset    Kidney disease Mother Shruthi Stack     Heart disease Mother Shruthi Stack     Stroke Mother Shruthi Stack     Aneurysm Mother Shruthi Stack     Hypertension Mother Shruthi Stack     Neuropathy Father Nghia Stack Jr     Kidney disease Father Nghia Stack Jr     Seizures Father Nghia Stack Jr     Diabetes Father Nghia Stack Jr     Asthma Sister Maxine Stack     Depression Sister Maxine Stack     Heart disease Maternal Grandfather      Diabetes Paternal Grandmother Daniella Stack     Cancer Paternal Grandfather Nghia Stack Sr      Social History[1]  Review of Systems   Constitutional:  Negative for fever.   Respiratory:  Negative for cough and shortness of breath.    Cardiovascular:  Negative for chest pain.   Gastrointestinal:  Positive for abdominal pain.   Genitourinary:  Negative for difficulty urinating and dysuria.   Musculoskeletal:  Negative for gait problem.   Skin:  Negative for color change.   Neurological:  Negative for dizziness, speech difficulty and headaches.   Psychiatric/Behavioral:  Negative for hallucinations and suicidal ideas.    All other systems reviewed and are negative.      Physical Exam     Initial Vitals [06/24/25 1905]   BP Pulse Resp Temp SpO2   (!) 142/84 79 16 98.2 °F (36.8 °C) 100 %      MAP       --         Physical  Exam    Nursing note and vitals reviewed.  Constitutional: She appears well-developed and well-nourished.   HENT:   Head: Normocephalic.   Eyes: EOM are normal.   Neck:   Normal range of motion.  Cardiovascular:  Normal rate, regular rhythm, normal heart sounds and intact distal pulses.           Pulmonary/Chest: Breath sounds normal. No respiratory distress.   Abdominal: Abdomen is soft. Bowel sounds are normal. There is no abdominal tenderness.   Musculoskeletal:         General: Normal range of motion.      Cervical back: Normal range of motion.     Neurological: She is alert and oriented to person, place, and time. She has normal strength.   Skin: Skin is warm and dry.   Psychiatric: She has a normal mood and affect. Her behavior is normal. Judgment and thought content normal.         ED Course   Procedures  Labs Reviewed   COMPREHENSIVE METABOLIC PANEL - Abnormal       Result Value    Sodium 140      Potassium 3.7      Chloride 108 (*)     CO2 22      Glucose 92      Blood Urea Nitrogen 5.5 (*)     Creatinine 0.87      Calcium 9.7      Protein Total 8.7 (*)     Albumin 3.7      Globulin 5.0 (*)     Albumin/Globulin Ratio 0.7 (*)     Bilirubin Total 0.2      ALP 99      ALT 8      AST 11      eGFR >60      Anion Gap 10.0      BUN/Creatinine Ratio 6     URINALYSIS, REFLEX TO URINE CULTURE - Abnormal    Color, UA Light-Yellow      Appearance, UA Clear      Specific Gravity, UA 1.011      pH, UA 5.5      Protein, UA Negative      Glucose, UA Normal      Ketones, UA Negative      Blood, UA Trace (*)     Bilirubin, UA Negative      Urobilinogen, UA Normal      Nitrites, UA Negative      Leukocyte Esterase,  (*)     RBC, UA 0-5      WBC, UA 0-5      Bacteria, UA Trace      Squamous Epithelial Cells, UA Trace      Mucous, UA Occasional (*)    CBC WITH DIFFERENTIAL - Abnormal    WBC 12.06 (*)     RBC 5.07      Hgb 11.5 (*)     Hct 39.5      MCV 77.9 (*)     MCH 22.7 (*)     MCHC 29.1 (*)     RDW 14.6      Platelet  456 (*)     MPV 9.1      Neut % 66.6      Lymph % 25.9      Mono % 6.0      Eos % 0.7      Basophil % 0.5      Imm Grans % 0.3      Neut # 8.03      Lymph # 3.12      Mono # 0.72      Eos # 0.09      Baso # 0.06      Imm Gran # 0.04      NRBC% 0.0     PREGNANCY TEST, URINE RAPID - Normal    hCG Qualitative, Urine Negative     CBC W/ AUTO DIFFERENTIAL    Narrative:     The following orders were created for panel order CBC auto differential.  Procedure                               Abnormality         Status                     ---------                               -----------         ------                     CBC with Differential[[id number]]       Abnormal            Final result                 Please view results for these tests on the individual orders.          Imaging Results              CT Abdomen Pelvis With IV Contrast NO Oral Contrast (Preliminary result)  Result time 06/25/25 00:49:45      Preliminary result by Brandt Ardon Jr., MD (06/25/25 00:49:45)                   Narrative:    START OF REPORT:  Technique: CT of the abdomen and pelvis was performed with axial images as well as sagittal and coronal reconstruction images with intravenous contrast.    Comparison: None available.    Clinical History: Abdominal Pain (Presents with LLQ abd pain w/ vomiting and bowel changes x 2-3 days. Has a known L ovarian cyst, first diagnosed in April 2025, told to report to ED for worsening pain by [facility] office. ).    Dosage Information: Automated Exposure Control was utilized 544 mGy.cm.    Findings:  Lines and Tubes: None.  Thorax:  Lungs: The visualized lung bases appear unremarkable. No focal infiltrate or consolidation is seen.  Pleura: No effusions or thickening are seen. No pneumothorax is seen in the visualized lung bases.  Heart: The heart size is within normal limits.  Abdomen:  Abdominal Wall: No abdominal wall pathology is seen.  Liver: There is a fairly defined hypoenhancing nodule in the  right hepatic lobe measruing 0.7 x 0.5 cm as seen on Series 2 Image 33 which may be reflective of a cyst or hemangioma. The liver otherwise appears unremarkable.  Biliary System: No intrahepatic biliary duct dilatation is seen.  Gallbladder: Surgical clips are seen in the gallbladder fossa consistent with prior cholecystectomy.  Pancreas: The pancreas appears unremarkable. No pancreatic mass, or, ductal dilatation is seen.  Spleen: The spleen appears unremarkable.  Adrenals: The adrenal glands appear unremarkable.  Kidneys: The kidneys appear unremarkable with no stones cysts masses or hydronephrosis.  Aorta: The visualized abdominal aorta appears unremarkable.  IVC: Unremarkable.  Bowel:  Esophagus: The visualized distal esophagus appears unremarkable.  Stomach: The stomach appears unremarkable.  Duodenum: Unremarkable appearing duodenum.  Small Bowel: The small bowel appears unremarkable.  Colon: Nondistended.  Appendix: The appendix appears unremarkable and is partially seen on Image 108, Series 2.  Peritoneum: Trace free fluid is seen in the pelvis.    Pelvis:  Bladder: The bladder appears unremarkable.  Female:  Uterus: The uterus appears unremarkable.  Ovaries: The ovaries appear unremarkable with probable dominant left sided physiologic cysts. No adnexal masses are seen.    Bony structures:  Dorsal Spine: There is subtle spondylosis of the visualized dorsal spine.  Bony Pelvis: The visualized bony structures of the pelvis appear unremarkable.      Impression:  1. No acute intraabdominal or pelvic solid organ or bowel pathology identified. Details and findings as discussed.                                         US PELVIS COMPLETE NON OB WITH DOPPLER (XPD) (Final result)  Result time 06/24/25 20:26:14      Final result by Biju Bacon MD (06/24/25 20:26:14)                   Impression:      No abnormalities are demonstrated      Electronically signed by: Biju Bacon  MD  Date:    06/24/2025  Time:    20:26               Narrative:    EXAMINATION:  Ultrasound of the pelvis    CLINICAL HISTORY:  Left-sided pelvic pain    TECHNIQUE:  Exam is performed both transabdominally and endovaginally    COMPARISON:  04/14/2025    FINDINGS:  Uterus measures 7.7 x 4 x 5 cm.  The endometrial stripe is 5 mm.  Patient has had a previous right oophorectomy.    The left ovary measures 3.6 x 1.7 x 2.4 cm.  There is flow present.  There are follicles present.                                       Medications   iohexoL (OMNIPAQUE 350) injection 100 mL (100 mLs Intravenous Given 6/25/25 0021)     Medical Decision Making  Historian:  Patient.  Patient is a White 35 y.o. female that presents with left lower quadrant abdominal pain that has been present 2-3 days. Associated symptoms nothing. Surrounding information is nothing. Exacerbated by nothing. Relieved by nothing. Patient treatment prior to arrival none. Risk factors include none. Other history pertaining to this complaint nothing.   Assessment:  See physical exam.  DD:  Ovarian cyst, ovarian torsion, diverticulitis, colitis  ED Course: History was obtained.  Physical was performed.  Patient's CT showed no acute findings.  Her ultrasound did not show a ovarian cyst.  She would have good flow to the ovaries.. Medical or surgical consults:  None. Social determinants that affect healthcare:  None.       Amount and/or Complexity of Data Reviewed  Labs:      Details: Labs unremarkable  Radiology: ordered.     Details: CT no acute findings    Risk  Prescription drug management.  Risk Details: Bentyl                                      Clinical Impression:  Final diagnoses:  [R10.32] Left lower quadrant abdominal pain (Primary)          ED Disposition Condition    Discharge Stable          ED Prescriptions       Medication Sig Dispense Start Date End Date Auth. Provider    dicyclomine (BENTYL) 20 mg tablet Take 1 tablet (20 mg total) by mouth 2 (two)  times daily as needed (abdominal pain). 20 tablet 6/25/2025 7/5/2025 Imer Ruelas FNP          Follow-up Information       Follow up With Specialties Details Why Contact Info    Your Primary Care Provider  Call in 3 days ed follow up                    [1]   Social History  Tobacco Use    Smoking status: Never     Passive exposure: Never    Smokeless tobacco: Never   Vaping Use    Vaping status: Never Used   Substance Use Topics    Alcohol use: Never    Drug use: Never        Imer Ruelas FNP  06/25/25 0052

## 2025-06-25 NOTE — ED NOTES
Pt. Dc home she states understanding of dc the need for follow up and rx she has no further questions at this time NAD steady

## 2025-06-25 NOTE — FIRST PROVIDER EVALUATION
Medical screening examination initiated.  I have conducted a focused provider triage encounter, findings are as follows:    Brief history of present illness:  35 year old female presents to ER with c/o LLQ abdominal pain with N/V. Patient reports hx of ovarian cyst    There were no vitals filed for this visit.    Pertinent physical exam:  awake and alert, nad    Brief workup plan:  labs, ua, US    Preliminary workup initiated; this workup will be continued and followed by the physician or advanced practice provider that is assigned to the patient when roomed.

## 2025-07-01 ENCOUNTER — HOSPITAL ENCOUNTER (OUTPATIENT)
Dept: RADIOLOGY | Facility: HOSPITAL | Age: 35
Discharge: HOME | End: 2025-07-01
Attending: OBSTETRICS & GYNECOLOGY
Payer: MEDICAID

## 2025-07-01 DIAGNOSIS — N83.209 CYST OF OVARY, UNSPECIFIED LATERALITY: ICD-10-CM

## 2025-07-01 PROCEDURE — 76856 US EXAM PELVIC COMPLETE: CPT | Mod: TC

## 2025-07-15 NOTE — PROGRESS NOTES
[facility] Neurology Follow Up Office Visit Note    Initial Visit Date: 3/29/2023  Last Visit Date: 5/2/2024  Current Visit Date:  07/17/2025    Chief Complaint:     Chief Complaint   Patient presents with    Migraine     Patient states she has about 3 migraines a month, she states her vision gets blurry more in the left more than the right         History of Present Illness:      This is 35 y.o. female with history of  anxiety disorder, depression, OCD who is referred for chronic migraine with visual scotoma. During last visit, rizatriptan was discontinued. Sumatriptan 100 mg twice a day as needed was started.     Age of Onset: 11 years old     Headache Description: bitemporal, pounding, severe, lasting at least 4 hours, with nausea, with photophobia and phonophobia. + right on visual scotoma.    Frequency:  5 migraine headache days per month, frequent visual scotoma.     Provocation Factors: denied    Risk Factors  - Family history of headache disorder: Yes mom and sister with headache disorder.   - History of focal CNS lesions: Yes pituitary adenoma.  - History of CNS infections: No  - History head trauma: No  - History of underlying mood disorder: Yes anxiety disorder and depression. Not yet seeing psych.   - History of sleep disorder: Yes not sleeping well.   - Recreational drug use: No  - Tobacco use: No  - Alcohol use: No  - Weight fluctuation: Yes fluctuate   - Isotretinoin or Tetracycline use:  No  - Family planning and contraceptive use: irregular menses.          Medications:     Current Prophylactic  Propranolol extended release 60 mg daily (10/5/2023 to present)    Current Abortive  Zofran 8 mg twice a day as needed (10/10/2022 to present)  Sumatriptan 100 mg twice a day as needed (5/2/2024 to present): ineffective.    Prior Prophylactic  Propanolol 20 mg twice a day (3/29/2023 to 10/5/2023): effective.  Ran out of refills.      Prior Abortive  Rizatriptan 10 mg twice a day as needed (10/10/2022 to 5/2/2024):  ineffective.  Not taking.    Devices:     - VNS:  - TNS  - TMS:     Procedures:     - Botox:  - PSG block:   - Occipital nerve block:     Labs:     Results for orders placed or performed during the hospital encounter of 06/24/25   Pregnancy, urine rapid    Collection Time: 06/24/25  7:13 PM   Result Value Ref Range    hCG Qualitative, Urine Negative Negative   Urinalysis, Reflex to Urine Culture Urine, Clean Catch    Collection Time: 06/24/25  7:13 PM    Specimen: Urine   Result Value Ref Range    Color, UA Light-Yellow Yellow, Light-Yellow, Colorless, Straw, Dark-Yellow    Appearance, UA Clear Clear    Specific Gravity, UA 1.011 1.005 - 1.030    pH, UA 5.5 5.0 - 8.5    Protein, UA Negative Negative    Glucose, UA Normal Negative, Normal    Ketones, UA Negative Negative    Blood, UA Trace (A) Negative    Bilirubin, UA Negative Negative    Urobilinogen, UA Normal 0.2, 1.0, Normal    Nitrites, UA Negative Negative    Leukocyte Esterase,  (A) Negative    RBC, UA 0-5 None Seen, 0-2, 3-5, 0-5 /HPF    WBC, UA 0-5 None Seen, 0-2, 3-5, 0-5 /HPF    Bacteria, UA Trace None Seen, Trace /HPF    Squamous Epithelial Cells, UA Trace None Seen, Trace /HPF    Mucous, UA Occasional (A) None Seen /LPF   Comprehensive metabolic panel    Collection Time: 06/24/25  9:31 PM   Result Value Ref Range    Sodium 140 136 - 145 mmol/L    Potassium 3.7 3.5 - 5.1 mmol/L    Chloride 108 (H) 98 - 107 mmol/L    CO2 22 22 - 29 mmol/L    Glucose 92 74 - 100 mg/dL    Blood Urea Nitrogen 5.5 (L) 7.0 - 18.7 mg/dL    Creatinine 0.87 0.55 - 1.02 mg/dL    Calcium 9.7 8.4 - 10.2 mg/dL    Protein Total 8.7 (H) 6.4 - 8.3 gm/dL    Albumin 3.7 3.5 - 5.0 g/dL    Globulin 5.0 (H) 2.4 - 3.5 gm/dL    Albumin/Globulin Ratio 0.7 (L) 1.1 - 2.0 ratio    Bilirubin Total 0.2 <=1.5 mg/dL    ALP 99 40 - 150 unit/L    ALT 8 0 - 55 unit/L    AST 11 11 - 45 unit/L    eGFR >60 mL/min/1.73/m2    Anion Gap 10.0 mEq/L    BUN/Creatinine Ratio 6    CBC with Differential     Collection Time: 06/24/25  9:31 PM   Result Value Ref Range    WBC 12.06 (H) 4.50 - 11.50 x10(3)/mcL    RBC 5.07 4.20 - 5.40 x10(6)/mcL    Hgb 11.5 (L) 12.0 - 16.0 g/dL    Hct 39.5 37.0 - 47.0 %    MCV 77.9 (L) 80.0 - 94.0 fL    MCH 22.7 (L) 27.0 - 31.0 pg    MCHC 29.1 (L) 33.0 - 36.0 g/dL    RDW 14.6 11.5 - 17.0 %    Platelet 456 (H) 130 - 400 x10(3)/mcL    MPV 9.1 7.4 - 10.4 fL    Neut % 66.6 %    Lymph % 25.9 %    Mono % 6.0 %    Eos % 0.7 %    Basophil % 0.5 %    Imm Grans % 0.3 %    Neut # 8.03 2.1 - 9.2 x10(3)/mcL    Lymph # 3.12 0.6 - 4.6 x10(3)/mcL    Mono # 0.72 0.1 - 1.3 x10(3)/mcL    Eos # 0.09 0 - 0.9 x10(3)/mcL    Baso # 0.06 <=0.2 x10(3)/mcL    Imm Gran # 0.04 0.00 - 0.04 x10(3)/mcL    NRBC% 0.0 %       Studies:     - MRI pituitary with and without contrast 10/19/2023: Findings seen consistent with 6.7 x 7 mm pituitary adenoma in the right-sided pituitary   - MRI Brain with and without contrast pituitary protocol at [facility]:  As per documentation, 6 mm hypoenhancing lesion consistent with pituitary microadenoma.    - MRA Head w/o Titus:   - MRV Head w/o Titus:   - NCHCT:  - Lumbar Puncture:    Review of Systems:     Review of Systems   Constitutional:  Positive for diaphoresis and malaise/fatigue.   Cardiovascular:  Positive for palpitations.   Neurological:  Positive for tingling and tremors.   All other systems reviewed and are negative.      Physical Exams:     Vitals:    07/17/25 0907   BP: 121/86   Pulse: 76   Resp: 18   Temp: 98.3 °F (36.8 °C)         Physical Exam  Vitals and nursing note reviewed.   Constitutional:       Appearance: Normal appearance.   HENT:      Head: Normocephalic and atraumatic.      Nose: Nose normal.      Mouth/Throat:      Mouth: Mucous membranes are moist.      Pharynx: Oropharynx is clear.   Eyes:      Conjunctiva/sclera: Conjunctivae normal.   Cardiovascular:      Rate and Rhythm: Normal rate and regular rhythm.      Pulses: Normal pulses.   Pulmonary:      Effort:  Pulmonary effort is normal.      Breath sounds: Normal breath sounds.   Abdominal:      General: Abdomen is flat.   Musculoskeletal:         General: Normal range of motion.      Cervical back: Normal range of motion.   Skin:     General: Skin is warm.   Neurological:      Mental Status: She is alert.         Comprehensive Neurological Exam:  Mental Status: Alert Oriented to Self, Date, and Place. Comprehension wnl. No dysarthria.   CN II - XII: FELICIA, No APD, Fundus wnl OU, VFFC, No ptosis OU, EOMI without nystagmus LT/Temp symmetric in CN V1-3 distribution, Hearing grossly intact, Face Symmetric, Tongue and Uvula midline, Trapezius symmetric bilateral.   Motor: tone and bulk wnl throughout, fine high-frequency postural tremor in bilateral upper extremity, 5/5 to confrontation, Fine finger movements wnl b/l, No pronator drift.   Sensory: LT, Proprioception, Vibration, PP, Temp symmetric. No sensory simultagnosia.   Reflexes: 2+ throughout  Cerebellar: FNF wnl b/l, RAHM wnl b/l  Romberg: Negative  Gait: normal.    Assessment:     This is 35 y.o. female with history of anxiety disorder, depression, OCD who is referred for chronic migraine with visual scotoma.     1. Chronic migraine without aura without status migrainosus, not intractable  -     amitriptyline (ELAVIL) 10 MG tablet; Take 1 tablet (10 mg total) by mouth every evening.  Dispense: 30 tablet; Refill: 3  -     ubrogepant (UBRELVY) 100 mg tablet; Take 1 tablet (100 mg total) by mouth 2 (two) times daily as needed for Migraine.  Dispense: 10 tablet; Refill: 4  -     propranoloL (INDERAL LA) 60 MG 24 hr capsule; Take 1 capsule (60 mg total) by mouth every evening.  Dispense: 90 capsule; Refill: 1    2. Anxiety disorder, unspecified type        Plan:     [] continue with Propranolol ER 60 mg daily   [] start Amitriptyline 10 mg at bedtime   [] stop Sumatriptan 100 mg twice a day as needed  [] start Ubrelvy 100 mg twice a day as needed     RTC 3 months via  Telemedicine    Headache education provided: good sleep hygiene and 7 hours of sleep per night, stress management, medication overuse education provided. Using more 3 OTC per week may worsen headaches, high intensity interval training has shown to reduce headache frequency. Low carb, high protein has shown to reduce headache frequency. Patient is instructed in keep headache diary.     I have explained the treatment plan, diagnosis, and prognosis to patient. All questions are answered to the best of my knowledge.     Face to face time 30 minutes, including documentation, chart review, counseling, education, review of test results, relevant medical records, and coordination of care.       Anabel Silvestre MD   General Neurology  07/17/2025

## 2025-07-17 ENCOUNTER — OFFICE VISIT (OUTPATIENT)
Dept: NEUROLOGY | Facility: CLINIC | Age: 35
End: 2025-07-17
Payer: MEDICAID

## 2025-07-17 VITALS
WEIGHT: 190 LBS | DIASTOLIC BLOOD PRESSURE: 86 MMHG | HEIGHT: 63 IN | HEART RATE: 76 BPM | OXYGEN SATURATION: 100 % | TEMPERATURE: 98 F | RESPIRATION RATE: 18 BRPM | SYSTOLIC BLOOD PRESSURE: 121 MMHG | BODY MASS INDEX: 33.66 KG/M2

## 2025-07-17 DIAGNOSIS — F41.9 ANXIETY DISORDER, UNSPECIFIED TYPE: ICD-10-CM

## 2025-07-17 DIAGNOSIS — G43.709 CHRONIC MIGRAINE WITHOUT AURA WITHOUT STATUS MIGRAINOSUS, NOT INTRACTABLE: Primary | ICD-10-CM

## 2025-07-17 PROCEDURE — 1159F MED LIST DOCD IN RCRD: CPT | Mod: CPTII,,, | Performed by: PSYCHIATRY & NEUROLOGY

## 2025-07-17 PROCEDURE — 3074F SYST BP LT 130 MM HG: CPT | Mod: CPTII,,, | Performed by: PSYCHIATRY & NEUROLOGY

## 2025-07-17 PROCEDURE — 99213 OFFICE O/P EST LOW 20 MIN: CPT | Mod: PBBFAC | Performed by: PSYCHIATRY & NEUROLOGY

## 2025-07-17 PROCEDURE — 3079F DIAST BP 80-89 MM HG: CPT | Mod: CPTII,,, | Performed by: PSYCHIATRY & NEUROLOGY

## 2025-07-17 PROCEDURE — 3008F BODY MASS INDEX DOCD: CPT | Mod: CPTII,,, | Performed by: PSYCHIATRY & NEUROLOGY

## 2025-07-17 PROCEDURE — 99214 OFFICE O/P EST MOD 30 MIN: CPT | Mod: S$PBB,,, | Performed by: PSYCHIATRY & NEUROLOGY

## 2025-07-17 PROCEDURE — G2211 COMPLEX E/M VISIT ADD ON: HCPCS | Mod: ,,, | Performed by: PSYCHIATRY & NEUROLOGY

## 2025-07-17 RX ORDER — AMITRIPTYLINE HYDROCHLORIDE 10 MG/1
10 TABLET, FILM COATED ORAL NIGHTLY
Qty: 30 TABLET | Refills: 3 | Status: SHIPPED | OUTPATIENT
Start: 2025-07-17 | End: 2025-11-14

## 2025-07-17 RX ORDER — PROPRANOLOL HYDROCHLORIDE 60 MG/1
60 CAPSULE, EXTENDED RELEASE ORAL NIGHTLY
Qty: 90 CAPSULE | Refills: 1 | Status: SHIPPED | OUTPATIENT
Start: 2025-07-17 | End: 2026-01-13